# Patient Record
Sex: FEMALE | Race: WHITE | NOT HISPANIC OR LATINO | Employment: FULL TIME
[De-identification: names, ages, dates, MRNs, and addresses within clinical notes are randomized per-mention and may not be internally consistent; named-entity substitution may affect disease eponyms.]

---

## 2017-06-17 ENCOUNTER — HEALTH MAINTENANCE LETTER (OUTPATIENT)
Age: 52
End: 2017-06-17

## 2019-05-16 ENCOUNTER — OFFICE VISIT (OUTPATIENT)
Dept: FAMILY MEDICINE | Facility: CLINIC | Age: 54
End: 2019-05-16

## 2019-05-16 VITALS
HEART RATE: 65 BPM | TEMPERATURE: 98 F | WEIGHT: 151.2 LBS | DIASTOLIC BLOOD PRESSURE: 84 MMHG | HEIGHT: 70 IN | SYSTOLIC BLOOD PRESSURE: 116 MMHG | BODY MASS INDEX: 21.64 KG/M2 | OXYGEN SATURATION: 98 % | RESPIRATION RATE: 18 BRPM

## 2019-05-16 DIAGNOSIS — Z71.89 ACP (ADVANCE CARE PLANNING): ICD-10-CM

## 2019-05-16 DIAGNOSIS — S76.112A STRAIN OF QUADRICEPS MUSCLE, LEFT, INITIAL ENCOUNTER: Primary | ICD-10-CM

## 2019-05-16 DIAGNOSIS — Z76.89 HEALTH CARE HOME: ICD-10-CM

## 2019-05-16 PROCEDURE — 99203 OFFICE O/P NEW LOW 30 MIN: CPT | Performed by: FAMILY MEDICINE

## 2019-05-16 SDOH — HEALTH STABILITY: MENTAL HEALTH: HOW MANY STANDARD DRINKS CONTAINING ALCOHOL DO YOU HAVE ON A TYPICAL DAY?: 1 OR 2

## 2019-05-16 SDOH — ECONOMIC STABILITY: INCOME INSECURITY: HOW HARD IS IT FOR YOU TO PAY FOR THE VERY BASICS LIKE FOOD, HOUSING, MEDICAL CARE, AND HEATING?: NOT HARD AT ALL

## 2019-05-16 SDOH — ECONOMIC STABILITY: FOOD INSECURITY: WITHIN THE PAST 12 MONTHS, YOU WORRIED THAT YOUR FOOD WOULD RUN OUT BEFORE YOU GOT MONEY TO BUY MORE.: NEVER TRUE

## 2019-05-16 SDOH — ECONOMIC STABILITY: TRANSPORTATION INSECURITY
IN THE PAST 12 MONTHS, HAS THE LACK OF TRANSPORTATION KEPT YOU FROM MEDICAL APPOINTMENTS OR FROM GETTING MEDICATIONS?: NO

## 2019-05-16 SDOH — HEALTH STABILITY: MENTAL HEALTH: HOW OFTEN DO YOU HAVE A DRINK CONTAINING ALCOHOL?: 2-3 TIMES A WEEK

## 2019-05-16 SDOH — ECONOMIC STABILITY: TRANSPORTATION INSECURITY
IN THE PAST 12 MONTHS, HAS LACK OF TRANSPORTATION KEPT YOU FROM MEETINGS, WORK, OR FROM GETTING THINGS NEEDED FOR DAILY LIVING?: NO

## 2019-05-16 SDOH — ECONOMIC STABILITY: FOOD INSECURITY: WITHIN THE PAST 12 MONTHS, THE FOOD YOU BOUGHT JUST DIDN'T LAST AND YOU DIDN'T HAVE MONEY TO GET MORE.: NEVER TRUE

## 2019-05-16 SDOH — HEALTH STABILITY: MENTAL HEALTH: HOW OFTEN DO YOU HAVE 6 OR MORE DRINKS ON ONE OCCASION?: NEVER

## 2019-05-16 ASSESSMENT — MIFFLIN-ST. JEOR: SCORE: 1361.09

## 2019-05-16 NOTE — PATIENT INSTRUCTIONS
Strain of quadriceps muscle, left, initial encounter  (primary encounter diagnosis)  Comment: concern about a tear that has not healed reviewed  Plan: ice. Ibuprofen. Rehab stretches/ exercises to begin immediately and given in writing with illustrations.  Schedule orthopedic consultation

## 2019-05-16 NOTE — NURSING NOTE
Celia is here today to establish care and discuss left hip pain.    Pre-visit Screening:  Immunizations:  up to date  Colonoscopy:  unknown  Mammogram: unknown  Asthma Action Test/Plan:  NA  PHQ9:  PHQ 2 done today  GAD7:  NA  Questioned patient about current smoking habits Pt. has never smoked.  Ok to leave detailed message on voice mail for today's visit only Yes, phone # [phone]
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
## 2019-10-01 ENCOUNTER — HEALTH MAINTENANCE LETTER (OUTPATIENT)
Age: 54
End: 2019-10-01

## 2020-09-02 ENCOUNTER — OFFICE VISIT (OUTPATIENT)
Dept: FAMILY MEDICINE | Facility: CLINIC | Age: 55
End: 2020-09-02

## 2020-09-02 DIAGNOSIS — B00.1 RECURRENT COLD SORES: Primary | ICD-10-CM

## 2020-09-02 PROCEDURE — 99212 OFFICE O/P EST SF 10 MIN: CPT | Mod: GT | Performed by: PHYSICIAN ASSISTANT

## 2020-09-02 RX ORDER — VALACYCLOVIR HYDROCHLORIDE 1 G/1
2000 TABLET, FILM COATED ORAL 2 TIMES DAILY
Qty: 12 TABLET | Refills: 1 | Status: SHIPPED | OUTPATIENT
Start: 2020-09-02 | End: 2021-08-18

## 2021-01-15 ENCOUNTER — HEALTH MAINTENANCE LETTER (OUTPATIENT)
Age: 56
End: 2021-01-15

## 2021-01-21 ENCOUNTER — OFFICE VISIT (OUTPATIENT)
Dept: FAMILY MEDICINE | Facility: CLINIC | Age: 56
End: 2021-01-21

## 2021-01-21 VITALS
RESPIRATION RATE: 20 BRPM | TEMPERATURE: 97.6 F | SYSTOLIC BLOOD PRESSURE: 118 MMHG | BODY MASS INDEX: 22.05 KG/M2 | HEIGHT: 70 IN | WEIGHT: 154 LBS | HEART RATE: 85 BPM | OXYGEN SATURATION: 98 % | DIASTOLIC BLOOD PRESSURE: 76 MMHG

## 2021-01-21 DIAGNOSIS — Z13.1 SCREENING FOR DIABETES MELLITUS: ICD-10-CM

## 2021-01-21 DIAGNOSIS — Z01.411 ENCOUNTER FOR GYNECOLOGICAL EXAMINATION WITH ABNORMAL FINDING: Primary | ICD-10-CM

## 2021-01-21 DIAGNOSIS — Z12.31 ENCOUNTER FOR SCREENING MAMMOGRAM FOR BREAST CANCER: ICD-10-CM

## 2021-01-21 DIAGNOSIS — B00.1 RECURRENT COLD SORES: ICD-10-CM

## 2021-01-21 DIAGNOSIS — Z11.59 NEED FOR HEPATITIS C SCREENING TEST: ICD-10-CM

## 2021-01-21 DIAGNOSIS — Z12.11 SPECIAL SCREENING FOR MALIGNANT NEOPLASMS, COLON: ICD-10-CM

## 2021-01-21 DIAGNOSIS — Z13.220 SCREENING CHOLESTEROL LEVEL: ICD-10-CM

## 2021-01-21 LAB
CHOLEST SERPL-MCNC: 226 MG/DL (ref 0–199)
CHOLEST/HDLC SERPL: 3 {RATIO} (ref 0–5)
GLUCOSE SERPL-MCNC: 86 MG/DL (ref 60–99)
HDLC SERPL-MCNC: 84 MG/DL (ref 40–150)
HEMOGLOBIN: 13.5 G/DL (ref 11.7–15.7)
LDLC SERPL CALC-MCNC: 127 MG/DL (ref 0–130)
TRIGL SERPL-MCNC: 73 MG/DL (ref 0–149)

## 2021-01-21 PROCEDURE — 80061 LIPID PANEL: CPT | Performed by: FAMILY MEDICINE

## 2021-01-21 PROCEDURE — 82947 ASSAY GLUCOSE BLOOD QUANT: CPT | Performed by: FAMILY MEDICINE

## 2021-01-21 PROCEDURE — 99396 PREV VISIT EST AGE 40-64: CPT | Performed by: FAMILY MEDICINE

## 2021-01-21 PROCEDURE — 85018 HEMOGLOBIN: CPT | Performed by: FAMILY MEDICINE

## 2021-01-21 PROCEDURE — 36415 COLL VENOUS BLD VENIPUNCTURE: CPT | Performed by: FAMILY MEDICINE

## 2021-01-21 PROCEDURE — 86803 HEPATITIS C AB TEST: CPT | Mod: 90 | Performed by: FAMILY MEDICINE

## 2021-01-21 SDOH — HEALTH STABILITY: MENTAL HEALTH: HOW OFTEN DO YOU HAVE A DRINK CONTAINING ALCOHOL?: 2-4 TIMES A MONTH

## 2021-01-21 ASSESSMENT — MIFFLIN-ST. JEOR: SCORE: 1363.79

## 2021-01-21 NOTE — NURSING NOTE
Patient is here for a full physical exam.    Pre-Visit Screening :  Immunizations : up to date  Colon Screening : pt declines  Mammogram: pt declines  Asthma Action Test/Plan : NA  PHQ9 :  PHQ2 done today  GAD7 :  NA  Patient's  BMI Body mass index is 22.1 kg/m .  Questioned patient about current smoking habits.  Pt. has never smoked.  OK to leave a detailed voice message regarding today's visit Yes, phone # [phone]      ETOH screening: addressed in history

## 2021-01-21 NOTE — PROGRESS NOTES
SUBJECTIVE:  Celia Ugalde is an 55 year old  postmenopausal woman   who presents for annual gyn exam. Menopause at age 48. No   bleeding, spotting, or discharge noted. She is refusing pelvic exam    Estrogen replacement therapy: never  SILVESTRE exposure: no  History of abnormal Pap smear: No  Family history of uterine or ovarian cancer: No  Regular self breast exam: Yes  History of abnormal mammogram: No/last one 10 years ago  Family history of breast cancer: No  History of abnormal lipids: No/ last check 10 years ago for life insurance    No past medical history on file.    Review of patient's family history indicates:  Problem: Alcohol/Drug      Relation: Father          Age of Onset: (Not Specified)  Problem: C.A.D.      Relation: Father          Age of Onset: (Not Specified)          Comment: age 65 MI  Problem: Peripheral Vascular Disease      Relation: Father          Age of Onset: 76          Comment: amputation  Problem: Hypertension      Relation: Mother          Age of Onset: (Not Specified)  Problem: Arrhythmia      Relation: Mother          Age of Onset: 89          Comment: a fib  Problem: Autism Spectrum Disorder      Relation: Son          Age of Onset: (Not Specified)  Problem: Depression      Relation: Son          Age of Onset: (Not Specified)  Problem: Diabetes      Relation: No family hx of          Age of Onset: (Not Specified)  Problem: Breast Cancer      Relation: No family hx of          Age of Onset: (Not Specified)  Problem: Cancer - colorectal      Relation: No family hx of          Age of Onset: (Not Specified)      Past Surgical History:  2009: AS EXC MALIG SKIN LESION FACE/EARS <=0.5 CM      Comment:  forehead/basal cell  age 14: HC TOOTH EXTRACTION W/FORCEP    Current Outpatient Medications:     valACYclovir (VALTREX) 1000 mg tablet    No current facility-administered medications for this visit.     No Known Allergies    Social History    Tobacco Use      Smoking status: Never  "Smoker      Smokeless tobacco: Never Used    Alcohol use: Yes      Frequency: 2-4 times a month      Drinks per session: 1 or 2      Binge frequency: Never      Comment: 1 glass wine per month      Review Of Systems  Ears/Nose/Throat: cold sores on occasion  Respiratory: No shortness of breath, dyspnea on exertion, cough, or hemoptysis  Cardiovascular: negative  Gastrointestinal: negative  Genitourinary: negative    OBJECTIVE:  /76 (BP Location: Left arm, Patient Position: Sitting, Cuff Size: Adult Regular)   Pulse 85   Temp 97.6  F (36.4  C) (Oral)   Ht 1.778 m (5' 10\")   Wt 69.9 kg (154 lb)   LMP 06/10/2008   SpO2 98%   BMI 22.10 kg/m    General appearance: healthy, alert, no distress and cooperative  Skin: Skin color, texture, turgor normal. No rashes or lesions.  Ears: negative  Nose/Sinuses: Nares normal. Septum midline. Mucosa normal. No drainage or sinus tenderness.  Oropharynx: Lips, mucosa, and tongue normal. Teeth and gums normal.  Neck: Neck supple. No adenopathy. Thyroid symmetric, normal size,, Carotids without bruits.  Lungs: negative, Percussion normal. Good diaphragmatic excursion. Lungs clear  Heart: negative, PMI normal. No lifts, heaves, or thrills. RRR. No murmurs, clicks gallops or rub  Breasts: Inspection negative. No nipple discharge or bleeding. No masses.  Abdomen: Abdomen soft, non-tender. BS normal. No masses, organomegaly  Pelvic: Deferred  BMI : Body mass index is 22.1 kg/m .    ASSESSMENT:(Z01.411) Encounter for gynecological examination with abnormal finding  (primary encounter diagnosis)  Plan: HEMOGLOBIN (BFP), VENOUS COLLECTION        Colonoscopy recommended  Exercise encouraged  Fasting lipid profile obtained  Flu shot recommended  Follow up in 1 year  Glucose obtained  Hemoglobin obtained  Mammogram recommended  PAP smear recommended  Routine breast self-exam encouraged  Seat belt use encouraged  Sunscreen recommended      (B00.1) Recurrent cold sores  Plan: I have " reviewed the patient's medical history in detail and updated the computerized patient record.      (Z13.1) Screening for diabetes mellitus  Plan: Glucose Fasting (BFP), VENOUS COLLECTION            (Z13.220) Screening cholesterol level  Plan: Lipid Panel (BFP), VENOUS COLLECTION            (Z11.59) Need for hepatitis C screening test  Plan: VENOUS COLLECTION, Hepatits C antibody ([facility])            (Z12.11) Special screening for malignant neoplasms, colon  Plan: GASTROENTEROLOGY ADULT REF PROCEDURE ONLY            (Z12.31) Encounter for screening mammogram for breast cancer  Plan: Mammo Screening digital (bilat)                Dx:  1)  Pap smear, mammogram  2)  Lipids at appropriate intervals    PE:  Reviewed health maintenance including diet, regular exercise,   estrogen replacement and periodic exams.

## 2021-01-21 NOTE — PATIENT INSTRUCTIONS
Await labs    Colonoscopy recommended  Exercise encouraged  Fasting lipid profile obtained  Flu shot recommended  Follow up in 1 year  Glucose obtained  Hemoglobin obtained  Mammogram recommended  PAP smear orecommended  Routine breast self-exam encouraged  Seat belt use encouraged  Sunscreen recommended

## 2021-01-22 LAB
HCV AB - QUEST: NORMAL
SIGNAL TO CUT OFF - QUEST: 0.01

## 2021-08-18 DIAGNOSIS — Z11.59 ENCOUNTER FOR SCREENING FOR OTHER VIRAL DISEASES: ICD-10-CM

## 2021-08-18 DIAGNOSIS — B00.1 RECURRENT COLD SORES: ICD-10-CM

## 2021-08-18 RX ORDER — VALACYCLOVIR HYDROCHLORIDE 1 G/1
2000 TABLET, FILM COATED ORAL 2 TIMES DAILY
Qty: 12 TABLET | Refills: 1 | Status: SHIPPED | OUTPATIENT
Start: 2021-08-18 | End: 2021-08-26

## 2021-08-18 NOTE — TELEPHONE ENCOUNTER
Celia Ugalde is requesting a refill of:    Pending Prescriptions:                       Disp   Refills    valACYclovir (VALTREX) 1000 mg tablet     12 tab*1            Sig: Take 2 tablets (2,000 mg) by mouth 2 times daily    Can you review for LES?

## 2021-08-26 RX ORDER — COVID-19 ANTIGEN TEST
220 KIT MISCELLANEOUS 2 TIMES DAILY PRN
Status: ON HOLD | COMMUNITY
End: 2021-09-13

## 2021-08-26 RX ORDER — VALACYCLOVIR HYDROCHLORIDE 1 G/1
1000 TABLET, FILM COATED ORAL 2 TIMES DAILY PRN
COMMUNITY
End: 2022-01-04

## 2021-09-04 ENCOUNTER — HEALTH MAINTENANCE LETTER (OUTPATIENT)
Age: 56
End: 2021-09-04

## 2021-09-07 ENCOUNTER — OFFICE VISIT (OUTPATIENT)
Dept: FAMILY MEDICINE | Facility: CLINIC | Age: 56
End: 2021-09-07

## 2021-09-07 VITALS
HEIGHT: 70 IN | WEIGHT: 150.8 LBS | OXYGEN SATURATION: 97 % | HEART RATE: 80 BPM | BODY MASS INDEX: 21.59 KG/M2 | DIASTOLIC BLOOD PRESSURE: 78 MMHG | SYSTOLIC BLOOD PRESSURE: 116 MMHG | TEMPERATURE: 97.6 F

## 2021-09-07 DIAGNOSIS — M16.52 POST-TRAUMATIC OSTEOARTHRITIS OF LEFT HIP: ICD-10-CM

## 2021-09-07 DIAGNOSIS — Z01.818 PREOPERATIVE EXAMINATION: Primary | ICD-10-CM

## 2021-09-07 LAB — HEMOGLOBIN: 12.3 G/DL (ref 11.7–15.7)

## 2021-09-07 PROCEDURE — 99214 OFFICE O/P EST MOD 30 MIN: CPT | Performed by: FAMILY MEDICINE

## 2021-09-07 PROCEDURE — 36415 COLL VENOUS BLD VENIPUNCTURE: CPT | Performed by: FAMILY MEDICINE

## 2021-09-07 PROCEDURE — 85018 HEMOGLOBIN: CPT | Performed by: FAMILY MEDICINE

## 2021-09-07 ASSESSMENT — MIFFLIN-ST. JEOR: SCORE: 1349.27

## 2021-09-07 NOTE — PROGRESS NOTES
[facility]  [address] [place] [address]  [address]  [facility] [address]  Phone: [phone]  Fax: [phone]  Primary Provider: Tess Justice  Pre-op Performing Provider: LISA HO      PREOPERATIVE EVALUATION:  Today's date: 2021    Celia Ugalde is a 55 year old female who presents for a preoperative evaluation. ( 61)    Surgical Information:  Surgery/Procedure: Left total hip arthroplasty  Surgery Location: [facility]  Surgeon: Dr. Jeffries  Surgery Date: 21  Time of Surgery: AM  Where patient plans to recover: One night stay at [facility], the home with family  Fax number for surgical facility: Note does not need to be faxed, will be available electronically in Epic.    Type of Anesthesia Anticipated: to be determined    Assessment & Plan     The proposed surgical procedure is considered INTERMEDIATE risk.    Preoperative examination      Post-traumatic osteoarthritis of left hip            Risks and Recommendations:  The patient has the following additional risks and recommendations for perioperative complications:   - No identified additional risk factors other than previously addressed    Medication Instructions:  Patient is on no chronic medications    RECOMMENDATION:  APPROVAL GIVEN to proceed with proposed procedure, without further diagnostic evaluation.    Review of the result(s) of each unique test - labs                  Subjective     HPI related to upcoming procedure: Left hip DJD    1. No - Have you ever had a heart attack or stroke?  2. No - Have you ever had surgery on your heart or blood vessels, such as a stent, coronary (heart) bypass, or surgery on an artery in the head, neck, heart, or legs?  3. No - Do you have chest pain when you are physically active?  4. No - Do you have a history of heart failure?  5. No - Do you currently have a cold, bronchitis, or symptoms of other respiratory (head and chest) infections?  6. No - Do you have a  cough, shortness of breath, or wheezing?  7. No - Do you or anyone in your family have a history of blood clots?  8. No - Do you or anyone in your family have a serious bleeding problem, such as long-lasting bleeding after surgeries or cuts?  9. No - Have you ever had anemia or been told to take iron pills?  10. No - Have you had any abnormal blood loss such as black, tarry or bloody stools, or abnormal vaginal bleeding?  11. No - Have you ever had a blood transfusion?  12. Yes - Are you willing to have a blood transfusion if it is medically needed before, during, or after your surgery?  13. No - Have you or anyone in your family ever had problems with anesthesia (sedation for surgery)?  14. No - Do you have sleep apnea, excessive snoring, or daytime drowsiness?   15. No - Do you have any artifical heart valves or other implanted medical devices, such as a pacemaker, defibrillator, or continuous glucose monitor?  16. No - Do you have any artifical joints?  17. No - Are you allergic to latex?  18. No - Is there any chance that you may be pregnant?      Health Care Directive:  Patient does not have a Health Care Directive or Living Will: Discussed advance care planning with patient; information given to patient to review.    0956}    Status of Chronic Conditions:  See problem list for active medical problems.  Problems all longstanding and stable, except as noted/documented.  See ROS for pertinent symptoms related to these conditions.      Review of Systems  CONSTITUTIONAL: NEGATIVE for fever, chills, change in weight  ENT/MOUTH: NEGATIVE for ear, mouth and throat problems  RESP: NEGATIVE for significant cough or SOB  CV: NEGATIVE for chest pain, palpitations or peripheral edema    Patient Active Problem List    Diagnosis Date Noted     Inguinal hernia 06/17/2008     Priority: Medium     Problem list name updated by automated process. Provider to review       Health Care Home 05/16/2019     Priority: Low     ACP  (advance care planning) 05/16/2019     Priority: Low      No past medical history on file.  Past Surgical History:   Procedure Laterality Date     AS EXC MALIG SKIN LESION FACE/EARS <=0.5 CM  2009    forehead/basal cell     HC TOOTH EXTRACTION W/FORCEP  age 14     Current Outpatient Medications   Medication Sig Dispense Refill     aspirin-acetaminophen-caffeine (EXCEDRIN MIGRAINE) 250-250-65 MG tablet Take 1 tablet by mouth every 6 hours as needed for headaches       Multiple Vitamins-Minerals (WOMENS MULTIVITAMIN PO) Take by mouth daily       naproxen sodium 220 MG capsule Take 220 mg by mouth 2 times daily as needed       valACYclovir (VALTREX) 1000 mg tablet Take 1,000 mg by mouth 2 times daily as needed         No Known Allergies     Social History     Tobacco Use     Smoking status: Never Smoker     Smokeless tobacco: Never Used   Substance Use Topics     Alcohol use: Yes     Comment: 0-1 glass wine per week     Family History   Problem Relation Age of Onset     Alcohol/Drug Father      C.A.D. Father         age 65 MI     Peripheral Vascular Disease Father 76        amputation     Hypertension Mother      Arrhythmia Mother 89        a fib     Autism Spectrum Disorder Son      Depression Son      Hyperlipidemia Brother      Diabetes No family hx of      Breast Cancer No family hx of      Cancer - colorectal No family hx of      History   Drug Use No         Objective     LMP 06/10/2008     Physical Exam  GENERAL APPEARANCE: healthy, alert and no distress  HENT: ear canals and TM's normal and nose and mouth without ulcers or lesions  RESP: lungs clear to auscultation - no rales, rhonchi or wheezes  CV: regular rate and rhythm, normal S1 S2, no S3 or S4 and no murmur, click or rub   ABDOMEN: soft, nontender, no HSM or masses and bowel sounds normal  NEURO: Normal strength and tone, sensory exam grossly normal, mentation intact and speech normal    Recent Labs   Lab Test 01/21/21  1104   HGB 13.5         Diagnostics:  Recent Results (from the past 24 hour(s))   HEMOGLOBIN (BFP)    Collection Time: 09/07/21 12:00 AM   Result Value Ref Range    Hemoglobin 12.3 11.7 - 15.7 g/dL      No EKG required, no history of coronary heart disease, significant arrhythmia, peripheral arterial disease or other structural heart disease.    Revised Cardiac Risk Index (RCRI):  The patient has the following serious cardiovascular risks for perioperative complications:   - No serious cardiac risks = 0 points     RCRI Interpretation: 0 points: Class I (very low risk - 0.4% complication rate)           Signed Electronically by: Hernando Sommer MD  Copy of this evaluation report is provided to requesting physician.

## 2021-09-07 NOTE — H&P (VIEW-ONLY)
[facility]  [address] [place] [address]  [address]  [facility] [address]  Phone: [phone]  Fax: [phone]  Primary Provider: Tess Justice  Pre-op Performing Provider: LISA HO      PREOPERATIVE EVALUATION:  Today's date: 2021    Celia Ugalde is a 55 year old female who presents for a preoperative evaluation. ( 61)    Surgical Information:  Surgery/Procedure: Left total hip arthroplasty  Surgery Location: [facility]  Surgeon: Dr. Jeffries  Surgery Date: 21  Time of Surgery: AM  Where patient plans to recover: One night stay at [facility], the home with family  Fax number for surgical facility: Note does not need to be faxed, will be available electronically in Epic.    Type of Anesthesia Anticipated: to be determined    Assessment & Plan     The proposed surgical procedure is considered INTERMEDIATE risk.    Preoperative examination      Post-traumatic osteoarthritis of left hip            Risks and Recommendations:  The patient has the following additional risks and recommendations for perioperative complications:   - No identified additional risk factors other than previously addressed    Medication Instructions:  Patient is on no chronic medications    RECOMMENDATION:  APPROVAL GIVEN to proceed with proposed procedure, without further diagnostic evaluation.    Review of the result(s) of each unique test - labs                  Subjective     HPI related to upcoming procedure: Left hip DJD    1. No - Have you ever had a heart attack or stroke?  2. No - Have you ever had surgery on your heart or blood vessels, such as a stent, coronary (heart) bypass, or surgery on an artery in the head, neck, heart, or legs?  3. No - Do you have chest pain when you are physically active?  4. No - Do you have a history of heart failure?  5. No - Do you currently have a cold, bronchitis, or symptoms of other respiratory (head and chest) infections?  6. No - Do you have a  cough, shortness of breath, or wheezing?  7. No - Do you or anyone in your family have a history of blood clots?  8. No - Do you or anyone in your family have a serious bleeding problem, such as long-lasting bleeding after surgeries or cuts?  9. No - Have you ever had anemia or been told to take iron pills?  10. No - Have you had any abnormal blood loss such as black, tarry or bloody stools, or abnormal vaginal bleeding?  11. No - Have you ever had a blood transfusion?  12. Yes - Are you willing to have a blood transfusion if it is medically needed before, during, or after your surgery?  13. No - Have you or anyone in your family ever had problems with anesthesia (sedation for surgery)?  14. No - Do you have sleep apnea, excessive snoring, or daytime drowsiness?   15. No - Do you have any artifical heart valves or other implanted medical devices, such as a pacemaker, defibrillator, or continuous glucose monitor?  16. No - Do you have any artifical joints?  17. No - Are you allergic to latex?  18. No - Is there any chance that you may be pregnant?      Health Care Directive:  Patient does not have a Health Care Directive or Living Will: Discussed advance care planning with patient; information given to patient to review.    0956}    Status of Chronic Conditions:  See problem list for active medical problems.  Problems all longstanding and stable, except as noted/documented.  See ROS for pertinent symptoms related to these conditions.      Review of Systems  CONSTITUTIONAL: NEGATIVE for fever, chills, change in weight  ENT/MOUTH: NEGATIVE for ear, mouth and throat problems  RESP: NEGATIVE for significant cough or SOB  CV: NEGATIVE for chest pain, palpitations or peripheral edema    Patient Active Problem List    Diagnosis Date Noted     Inguinal hernia 06/17/2008     Priority: Medium     Problem list name updated by automated process. Provider to review       Health Care Home 05/16/2019     Priority: Low     ACP  (advance care planning) 05/16/2019     Priority: Low      No past medical history on file.  Past Surgical History:   Procedure Laterality Date     AS EXC MALIG SKIN LESION FACE/EARS <=0.5 CM  2009    forehead/basal cell     HC TOOTH EXTRACTION W/FORCEP  age 14     Current Outpatient Medications   Medication Sig Dispense Refill     aspirin-acetaminophen-caffeine (EXCEDRIN MIGRAINE) 250-250-65 MG tablet Take 1 tablet by mouth every 6 hours as needed for headaches       Multiple Vitamins-Minerals (WOMENS MULTIVITAMIN PO) Take by mouth daily       naproxen sodium 220 MG capsule Take 220 mg by mouth 2 times daily as needed       valACYclovir (VALTREX) 1000 mg tablet Take 1,000 mg by mouth 2 times daily as needed         No Known Allergies     Social History     Tobacco Use     Smoking status: Never Smoker     Smokeless tobacco: Never Used   Substance Use Topics     Alcohol use: Yes     Comment: 0-1 glass wine per week     Family History   Problem Relation Age of Onset     Alcohol/Drug Father      C.A.D. Father         age 65 MI     Peripheral Vascular Disease Father 76        amputation     Hypertension Mother      Arrhythmia Mother 89        a fib     Autism Spectrum Disorder Son      Depression Son      Hyperlipidemia Brother      Diabetes No family hx of      Breast Cancer No family hx of      Cancer - colorectal No family hx of      History   Drug Use No         Objective     LMP 06/10/2008     Physical Exam  GENERAL APPEARANCE: healthy, alert and no distress  HENT: ear canals and TM's normal and nose and mouth without ulcers or lesions  RESP: lungs clear to auscultation - no rales, rhonchi or wheezes  CV: regular rate and rhythm, normal S1 S2, no S3 or S4 and no murmur, click or rub   ABDOMEN: soft, nontender, no HSM or masses and bowel sounds normal  NEURO: Normal strength and tone, sensory exam grossly normal, mentation intact and speech normal    Recent Labs   Lab Test 01/21/21  1104   HGB 13.5         Diagnostics:  Recent Results (from the past 24 hour(s))   HEMOGLOBIN (BFP)    Collection Time: 09/07/21 12:00 AM   Result Value Ref Range    Hemoglobin 12.3 11.7 - 15.7 g/dL      No EKG required, no history of coronary heart disease, significant arrhythmia, peripheral arterial disease or other structural heart disease.    Revised Cardiac Risk Index (RCRI):  The patient has the following serious cardiovascular risks for perioperative complications:   - No serious cardiac risks = 0 points     RCRI Interpretation: 0 points: Class I (very low risk - 0.4% complication rate)           Signed Electronically by: Hernando Sommer MD  Copy of this evaluation report is provided to requesting physician.

## 2021-09-10 ENCOUNTER — LAB (OUTPATIENT)
Dept: LAB | Facility: CLINIC | Age: 56
End: 2021-09-10
Attending: ORTHOPAEDIC SURGERY
Payer: COMMERCIAL

## 2021-09-10 DIAGNOSIS — Z11.59 ENCOUNTER FOR SCREENING FOR OTHER VIRAL DISEASES: ICD-10-CM

## 2021-09-10 PROCEDURE — U0005 INFEC AGEN DETEC AMPLI PROBE: HCPCS

## 2021-09-10 PROCEDURE — U0003 INFECTIOUS AGENT DETECTION BY NUCLEIC ACID (DNA OR RNA); SEVERE ACUTE RESPIRATORY SYNDROME CORONAVIRUS 2 (SARS-COV-2) (CORONAVIRUS DISEASE [COVID-19]), AMPLIFIED PROBE TECHNIQUE, MAKING USE OF HIGH THROUGHPUT TECHNOLOGIES AS DESCRIBED BY CMS-2020-01-R: HCPCS

## 2021-09-11 LAB — SARS-COV-2 RNA RESP QL NAA+PROBE: NEGATIVE

## 2021-09-12 NOTE — PHARMACY-ADMISSION MEDICATION HISTORY
Admission medication history interview status for this patient is complete. See [facility] admission navigator for allergy information, prior to admission medications and immunization status.     PTA meds completed by pre-admitting nurse Ladonna Garzon and reviewed by pharmacy       Prior to Admission medications    Medication Sig Last Dose Taking? Auth Provider   aspirin-acetaminophen-caffeine (EXCEDRIN MIGRAINE) 250-250-65 MG tablet Take 1 tablet by mouth every 6 hours as needed for headaches  Yes Reported, Patient   Multiple Vitamins-Minerals (WOMENS MULTIVITAMIN PO) Take 1 tablet by mouth daily   Yes Reported, Patient   naproxen sodium 220 MG capsule Take 220 mg by mouth 2 times daily as needed  Yes Reported, Patient   valACYclovir (VALTREX) 1000 mg tablet Take 1,000 mg by mouth 2 times daily as needed  Yes Reported, Patient

## 2021-09-13 ENCOUNTER — ANESTHESIA (OUTPATIENT)
Dept: SURGERY | Facility: CLINIC | Age: 56
End: 2021-09-13
Payer: COMMERCIAL

## 2021-09-13 ENCOUNTER — ANESTHESIA EVENT (OUTPATIENT)
Dept: SURGERY | Facility: CLINIC | Age: 56
End: 2021-09-13
Payer: COMMERCIAL

## 2021-09-13 ENCOUNTER — HOSPITAL ENCOUNTER (OUTPATIENT)
Facility: CLINIC | Age: 56
Discharge: HOME | End: 2021-09-14
Attending: ORTHOPAEDIC SURGERY | Admitting: ORTHOPAEDIC SURGERY
Payer: COMMERCIAL

## 2021-09-13 ENCOUNTER — APPOINTMENT (OUTPATIENT)
Dept: PHYSICAL THERAPY | Facility: CLINIC | Age: 56
End: 2021-09-13
Attending: ORTHOPAEDIC SURGERY
Payer: COMMERCIAL

## 2021-09-13 ENCOUNTER — APPOINTMENT (OUTPATIENT)
Dept: GENERAL RADIOLOGY | Facility: CLINIC | Age: 56
End: 2021-09-13
Attending: ORTHOPAEDIC SURGERY
Payer: COMMERCIAL

## 2021-09-13 DIAGNOSIS — Z96.649 HISTORY OF TOTAL HIP REPLACEMENT, UNSPECIFIED LATERALITY: Primary | ICD-10-CM

## 2021-09-13 PROCEDURE — 999N000065 XR PELVIS AND HIP PORTABLE LEFT 1 VIEW

## 2021-09-13 PROCEDURE — 250N000011 HC RX IP 250 OP 636: Performed by: NURSE ANESTHETIST, CERTIFIED REGISTERED

## 2021-09-13 PROCEDURE — 250N000013 HC RX MED GY IP 250 OP 250 PS 637: Performed by: ORTHOPAEDIC SURGERY

## 2021-09-13 PROCEDURE — 272N000001 HC OR GENERAL SUPPLY STERILE: Performed by: ORTHOPAEDIC SURGERY

## 2021-09-13 PROCEDURE — 97161 PT EVAL LOW COMPLEX 20 MIN: CPT | Mod: GP | Performed by: PHYSICAL THERAPIST

## 2021-09-13 PROCEDURE — 250N000011 HC RX IP 250 OP 636: Performed by: PHYSICIAN ASSISTANT

## 2021-09-13 PROCEDURE — 258N000003 HC RX IP 258 OP 636: Performed by: PHYSICIAN ASSISTANT

## 2021-09-13 PROCEDURE — 258N000003 HC RX IP 258 OP 636: Performed by: NURSE ANESTHETIST, CERTIFIED REGISTERED

## 2021-09-13 PROCEDURE — C1776 JOINT DEVICE (IMPLANTABLE): HCPCS | Performed by: ORTHOPAEDIC SURGERY

## 2021-09-13 PROCEDURE — 258N000003 HC RX IP 258 OP 636: Performed by: ANESTHESIOLOGY

## 2021-09-13 PROCEDURE — 710N000009 HC RECOVERY PHASE 1, LEVEL 1, PER MIN: Performed by: ORTHOPAEDIC SURGERY

## 2021-09-13 PROCEDURE — 250N000011 HC RX IP 250 OP 636: Performed by: ORTHOPAEDIC SURGERY

## 2021-09-13 PROCEDURE — 999N000141 HC STATISTIC PRE-PROCEDURE NURSING ASSESSMENT: Performed by: ORTHOPAEDIC SURGERY

## 2021-09-13 PROCEDURE — 97110 THERAPEUTIC EXERCISES: CPT | Mod: GP | Performed by: PHYSICAL THERAPIST

## 2021-09-13 PROCEDURE — 250N000009 HC RX 250: Performed by: NURSE ANESTHETIST, CERTIFIED REGISTERED

## 2021-09-13 PROCEDURE — 97116 GAIT TRAINING THERAPY: CPT | Mod: GP | Performed by: PHYSICAL THERAPIST

## 2021-09-13 PROCEDURE — 370N000017 HC ANESTHESIA TECHNICAL FEE, PER MIN: Performed by: ORTHOPAEDIC SURGERY

## 2021-09-13 PROCEDURE — C1713 ANCHOR/SCREW BN/BN,TIS/BN: HCPCS | Performed by: ORTHOPAEDIC SURGERY

## 2021-09-13 PROCEDURE — 360N000077 HC SURGERY LEVEL 4, PER MIN: Performed by: ORTHOPAEDIC SURGERY

## 2021-09-13 PROCEDURE — 258N000001 HC RX 258: Performed by: ORTHOPAEDIC SURGERY

## 2021-09-13 PROCEDURE — 250N000013 HC RX MED GY IP 250 OP 250 PS 637: Performed by: PHYSICIAN ASSISTANT

## 2021-09-13 PROCEDURE — 250N000009 HC RX 250: Performed by: ORTHOPAEDIC SURGERY

## 2021-09-13 DEVICE — IMPLANTABLE DEVICE: Type: IMPLANTABLE DEVICE | Site: HIP | Status: FUNCTIONAL

## 2021-09-13 DEVICE — IMP SHELL BIOM G7 ACETAB PPS LIM HOLE 56MM SZ F 010000665: Type: IMPLANTABLE DEVICE | Site: HIP | Status: FUNCTIONAL

## 2021-09-13 DEVICE — IMP SCR ZIM 6.5X30MM ACET CUP SELF TAP 00-6250-065-30: Type: IMPLANTABLE DEVICE | Site: HIP | Status: FUNCTIONAL

## 2021-09-13 DEVICE — IMP SCR ZIM 6.5X35MM ACET CUP SELF TAP 00-6250-065-35: Type: IMPLANTABLE DEVICE | Site: HIP | Status: FUNCTIONAL

## 2021-09-13 DEVICE — IMP HEAD FEM ZIM BIOLOX DELTA CER 36MM  -3.5 00-8775-036-01: Type: IMPLANTABLE DEVICE | Site: HIP | Status: FUNCTIONAL

## 2021-09-13 RX ORDER — DEXAMETHASONE SODIUM PHOSPHATE 4 MG/ML
INJECTION, SOLUTION INTRA-ARTICULAR; INTRALESIONAL; INTRAMUSCULAR; INTRAVENOUS; SOFT TISSUE PRN
Status: DISCONTINUED | OUTPATIENT
Start: 2021-09-13 | End: 2021-09-13

## 2021-09-13 RX ORDER — BUPIVACAINE HYDROCHLORIDE 5 MG/ML
INJECTION, SOLUTION EPIDURAL; INTRACAUDAL PRN
Status: DISCONTINUED | OUTPATIENT
Start: 2021-09-13 | End: 2021-09-13

## 2021-09-13 RX ORDER — HYDROMORPHONE HCL IN WATER/PF 6 MG/30 ML
0.4 PATIENT CONTROLLED ANALGESIA SYRINGE INTRAVENOUS
Status: DISCONTINUED | OUTPATIENT
Start: 2021-09-13 | End: 2021-09-14 | Stop reason: HOSPADM

## 2021-09-13 RX ORDER — ONDANSETRON 2 MG/ML
4 INJECTION INTRAMUSCULAR; INTRAVENOUS EVERY 6 HOURS PRN
Status: DISCONTINUED | OUTPATIENT
Start: 2021-09-13 | End: 2021-09-14 | Stop reason: HOSPADM

## 2021-09-13 RX ORDER — ONDANSETRON 2 MG/ML
4 INJECTION INTRAMUSCULAR; INTRAVENOUS EVERY 30 MIN PRN
Status: DISCONTINUED | OUTPATIENT
Start: 2021-09-13 | End: 2021-09-13 | Stop reason: HOSPADM

## 2021-09-13 RX ORDER — HYDRALAZINE HYDROCHLORIDE 20 MG/ML
2.5-5 INJECTION INTRAMUSCULAR; INTRAVENOUS EVERY 10 MIN PRN
Status: DISCONTINUED | OUTPATIENT
Start: 2021-09-13 | End: 2021-09-13 | Stop reason: HOSPADM

## 2021-09-13 RX ORDER — FENTANYL CITRATE 50 UG/ML
50 INJECTION, SOLUTION INTRAMUSCULAR; INTRAVENOUS EVERY 5 MIN PRN
Status: DISCONTINUED | OUTPATIENT
Start: 2021-09-13 | End: 2021-09-13 | Stop reason: HOSPADM

## 2021-09-13 RX ORDER — TRANEXAMIC ACID 650 MG/1
1950 TABLET ORAL ONCE
Status: COMPLETED | OUTPATIENT
Start: 2021-09-13 | End: 2021-09-13

## 2021-09-13 RX ORDER — BISACODYL 10 MG
10 SUPPOSITORY, RECTAL RECTAL DAILY PRN
Status: DISCONTINUED | OUTPATIENT
Start: 2021-09-13 | End: 2021-09-14 | Stop reason: HOSPADM

## 2021-09-13 RX ORDER — OXYCODONE HYDROCHLORIDE 5 MG/1
5 TABLET ORAL EVERY 4 HOURS PRN
Status: DISCONTINUED | OUTPATIENT
Start: 2021-09-13 | End: 2021-09-14 | Stop reason: HOSPADM

## 2021-09-13 RX ORDER — SODIUM CHLORIDE, SODIUM LACTATE, POTASSIUM CHLORIDE, CALCIUM CHLORIDE 600; 310; 30; 20 MG/100ML; MG/100ML; MG/100ML; MG/100ML
INJECTION, SOLUTION INTRAVENOUS CONTINUOUS
Status: DISCONTINUED | OUTPATIENT
Start: 2021-09-13 | End: 2021-09-14 | Stop reason: HOSPADM

## 2021-09-13 RX ORDER — NALOXONE HYDROCHLORIDE 0.4 MG/ML
0.4 INJECTION, SOLUTION INTRAMUSCULAR; INTRAVENOUS; SUBCUTANEOUS
Status: DISCONTINUED | OUTPATIENT
Start: 2021-09-13 | End: 2021-09-14 | Stop reason: HOSPADM

## 2021-09-13 RX ORDER — OXYCODONE HYDROCHLORIDE 5 MG/1
5 TABLET ORAL EVERY 4 HOURS PRN
Status: DISCONTINUED | OUTPATIENT
Start: 2021-09-13 | End: 2021-09-13 | Stop reason: HOSPADM

## 2021-09-13 RX ORDER — LIDOCAINE 40 MG/G
CREAM TOPICAL
Status: DISCONTINUED | OUTPATIENT
Start: 2021-09-13 | End: 2021-09-13 | Stop reason: HOSPADM

## 2021-09-13 RX ORDER — NALOXONE HYDROCHLORIDE 0.4 MG/ML
0.2 INJECTION, SOLUTION INTRAMUSCULAR; INTRAVENOUS; SUBCUTANEOUS
Status: DISCONTINUED | OUTPATIENT
Start: 2021-09-13 | End: 2021-09-14 | Stop reason: HOSPADM

## 2021-09-13 RX ORDER — OXYCODONE HYDROCHLORIDE 5 MG/1
10 TABLET ORAL EVERY 4 HOURS PRN
Status: DISCONTINUED | OUTPATIENT
Start: 2021-09-13 | End: 2021-09-14 | Stop reason: HOSPADM

## 2021-09-13 RX ORDER — SODIUM CHLORIDE, SODIUM LACTATE, POTASSIUM CHLORIDE, CALCIUM CHLORIDE 600; 310; 30; 20 MG/100ML; MG/100ML; MG/100ML; MG/100ML
INJECTION, SOLUTION INTRAVENOUS CONTINUOUS
Status: DISCONTINUED | OUTPATIENT
Start: 2021-09-13 | End: 2021-09-13 | Stop reason: HOSPADM

## 2021-09-13 RX ORDER — HYDROXYZINE HYDROCHLORIDE 25 MG/1
25 TABLET, FILM COATED ORAL EVERY 6 HOURS PRN
Status: DISCONTINUED | OUTPATIENT
Start: 2021-09-13 | End: 2021-09-14 | Stop reason: HOSPADM

## 2021-09-13 RX ORDER — CEFAZOLIN SODIUM/WATER 2 G/20 ML
2 SYRINGE (ML) INTRAVENOUS
Status: COMPLETED | OUTPATIENT
Start: 2021-09-13 | End: 2021-09-13

## 2021-09-13 RX ORDER — ONDANSETRON 4 MG/1
4 TABLET, ORALLY DISINTEGRATING ORAL EVERY 6 HOURS PRN
Status: DISCONTINUED | OUTPATIENT
Start: 2021-09-13 | End: 2021-09-14 | Stop reason: HOSPADM

## 2021-09-13 RX ORDER — IBUPROFEN 400 MG/1
400 TABLET, FILM COATED ORAL EVERY 4 HOURS PRN
Status: DISCONTINUED | OUTPATIENT
Start: 2021-09-13 | End: 2021-09-14 | Stop reason: HOSPADM

## 2021-09-13 RX ORDER — HYDROMORPHONE HCL IN WATER/PF 6 MG/30 ML
0.2 PATIENT CONTROLLED ANALGESIA SYRINGE INTRAVENOUS
Status: DISCONTINUED | OUTPATIENT
Start: 2021-09-13 | End: 2021-09-14 | Stop reason: HOSPADM

## 2021-09-13 RX ORDER — PROCHLORPERAZINE MALEATE 5 MG
10 TABLET ORAL EVERY 6 HOURS PRN
Status: DISCONTINUED | OUTPATIENT
Start: 2021-09-13 | End: 2021-09-14 | Stop reason: HOSPADM

## 2021-09-13 RX ORDER — POLYETHYLENE GLYCOL 3350 17 G/17G
17 POWDER, FOR SOLUTION ORAL DAILY
Status: DISCONTINUED | OUTPATIENT
Start: 2021-09-14 | End: 2021-09-14 | Stop reason: HOSPADM

## 2021-09-13 RX ORDER — OXYCODONE HYDROCHLORIDE 5 MG/1
TABLET ORAL
Qty: 50 TABLET | Refills: 0 | Status: SHIPPED | OUTPATIENT
Start: 2021-09-13 | End: 2022-02-03

## 2021-09-13 RX ORDER — AMOXICILLIN 250 MG
1-2 CAPSULE ORAL 2 TIMES DAILY
Qty: 30 TABLET | Refills: 0 | Status: SHIPPED | OUTPATIENT
Start: 2021-09-13 | End: 2022-02-03

## 2021-09-13 RX ORDER — PROPOFOL 10 MG/ML
INJECTION, EMULSION INTRAVENOUS CONTINUOUS PRN
Status: DISCONTINUED | OUTPATIENT
Start: 2021-09-13 | End: 2021-09-13

## 2021-09-13 RX ORDER — LABETALOL HYDROCHLORIDE 5 MG/ML
10 INJECTION, SOLUTION INTRAVENOUS
Status: DISCONTINUED | OUTPATIENT
Start: 2021-09-13 | End: 2021-09-13 | Stop reason: HOSPADM

## 2021-09-13 RX ORDER — EPHEDRINE SULFATE 50 MG/ML
INJECTION, SOLUTION INTRAMUSCULAR; INTRAVENOUS; SUBCUTANEOUS PRN
Status: DISCONTINUED | OUTPATIENT
Start: 2021-09-13 | End: 2021-09-13

## 2021-09-13 RX ORDER — LIDOCAINE 40 MG/G
CREAM TOPICAL
Status: DISCONTINUED | OUTPATIENT
Start: 2021-09-13 | End: 2021-09-14 | Stop reason: HOSPADM

## 2021-09-13 RX ORDER — FENTANYL CITRATE 50 UG/ML
INJECTION, SOLUTION INTRAMUSCULAR; INTRAVENOUS PRN
Status: DISCONTINUED | OUTPATIENT
Start: 2021-09-13 | End: 2021-09-13

## 2021-09-13 RX ORDER — IBUPROFEN 600 MG/1
600 TABLET, FILM COATED ORAL EVERY 6 HOURS PRN
Qty: 30 TABLET | Refills: 0 | Status: SHIPPED | OUTPATIENT
Start: 2021-09-13 | End: 2022-02-03

## 2021-09-13 RX ORDER — CEFAZOLIN SODIUM 1 G/3ML
1 INJECTION, POWDER, FOR SOLUTION INTRAMUSCULAR; INTRAVENOUS EVERY 8 HOURS
Status: COMPLETED | OUTPATIENT
Start: 2021-09-13 | End: 2021-09-13

## 2021-09-13 RX ORDER — ACETAMINOPHEN 325 MG/1
650 TABLET ORAL EVERY 4 HOURS PRN
Qty: 100 TABLET | Refills: 0 | Status: SHIPPED | OUTPATIENT
Start: 2021-09-13 | End: 2022-02-03

## 2021-09-13 RX ORDER — ACETAMINOPHEN 325 MG/1
975 TABLET ORAL EVERY 8 HOURS
Status: DISCONTINUED | OUTPATIENT
Start: 2021-09-13 | End: 2021-09-14 | Stop reason: HOSPADM

## 2021-09-13 RX ORDER — ACETAMINOPHEN 325 MG/1
650 TABLET ORAL EVERY 4 HOURS PRN
Status: DISCONTINUED | OUTPATIENT
Start: 2021-09-16 | End: 2021-09-14 | Stop reason: HOSPADM

## 2021-09-13 RX ORDER — HYDROXYZINE HYDROCHLORIDE 25 MG/1
25 TABLET, FILM COATED ORAL EVERY 6 HOURS PRN
Qty: 30 TABLET | Refills: 0 | Status: SHIPPED | OUTPATIENT
Start: 2021-09-13 | End: 2021-09-27

## 2021-09-13 RX ORDER — AMOXICILLIN 250 MG
1 CAPSULE ORAL 2 TIMES DAILY
Status: DISCONTINUED | OUTPATIENT
Start: 2021-09-13 | End: 2021-09-14 | Stop reason: HOSPADM

## 2021-09-13 RX ORDER — ONDANSETRON 4 MG/1
4 TABLET, ORALLY DISINTEGRATING ORAL EVERY 30 MIN PRN
Status: DISCONTINUED | OUTPATIENT
Start: 2021-09-13 | End: 2021-09-13 | Stop reason: HOSPADM

## 2021-09-13 RX ORDER — HYDROMORPHONE HCL IN WATER/PF 6 MG/30 ML
0.4 PATIENT CONTROLLED ANALGESIA SYRINGE INTRAVENOUS EVERY 5 MIN PRN
Status: DISCONTINUED | OUTPATIENT
Start: 2021-09-13 | End: 2021-09-13 | Stop reason: HOSPADM

## 2021-09-13 RX ORDER — ACETAMINOPHEN 325 MG/1
975 TABLET ORAL ONCE
Status: COMPLETED | OUTPATIENT
Start: 2021-09-13 | End: 2021-09-13

## 2021-09-13 RX ORDER — SODIUM CHLORIDE, SODIUM LACTATE, POTASSIUM CHLORIDE, CALCIUM CHLORIDE 600; 310; 30; 20 MG/100ML; MG/100ML; MG/100ML; MG/100ML
INJECTION, SOLUTION INTRAVENOUS CONTINUOUS PRN
Status: DISCONTINUED | OUTPATIENT
Start: 2021-09-13 | End: 2021-09-13

## 2021-09-13 RX ORDER — CEFAZOLIN SODIUM 2 G/100ML
2 INJECTION, SOLUTION INTRAVENOUS SEE ADMIN INSTRUCTIONS
Status: DISCONTINUED | OUTPATIENT
Start: 2021-09-13 | End: 2021-09-13 | Stop reason: HOSPADM

## 2021-09-13 RX ORDER — ONDANSETRON 2 MG/ML
INJECTION INTRAMUSCULAR; INTRAVENOUS PRN
Status: DISCONTINUED | OUTPATIENT
Start: 2021-09-13 | End: 2021-09-13

## 2021-09-13 RX ADMIN — SODIUM CHLORIDE, SODIUM LACTATE, POTASSIUM CHLORIDE, CALCIUM CHLORIDE: 600; 310; 30; 20 INJECTION, SOLUTION INTRAVENOUS at 08:05

## 2021-09-13 RX ADMIN — SODIUM CHLORIDE, POTASSIUM CHLORIDE, SODIUM LACTATE AND CALCIUM CHLORIDE: 600; 310; 30; 20 INJECTION, SOLUTION INTRAVENOUS at 12:46

## 2021-09-13 RX ADMIN — SODIUM CHLORIDE, SODIUM LACTATE, POTASSIUM CHLORIDE, CALCIUM CHLORIDE: 600; 310; 30; 20 INJECTION, SOLUTION INTRAVENOUS at 07:10

## 2021-09-13 RX ADMIN — BUPIVACAINE HYDROCHLORIDE 10 MG: 5 INJECTION, SOLUTION EPIDURAL; INTRACAUDAL at 07:27

## 2021-09-13 RX ADMIN — Medication 2 G: at 07:15

## 2021-09-13 RX ADMIN — DOCUSATE SODIUM AND SENNOSIDES 1 TABLET: 8.6; 5 TABLET, FILM COATED ORAL at 19:53

## 2021-09-13 RX ADMIN — CEFAZOLIN 1 G: 1 INJECTION, POWDER, FOR SOLUTION INTRAMUSCULAR; INTRAVENOUS at 22:14

## 2021-09-13 RX ADMIN — ACETAMINOPHEN 975 MG: 325 TABLET, FILM COATED ORAL at 22:14

## 2021-09-13 RX ADMIN — Medication 10 MG: at 07:55

## 2021-09-13 RX ADMIN — ACETAMINOPHEN 975 MG: 325 TABLET, FILM COATED ORAL at 15:21

## 2021-09-13 RX ADMIN — OXYCODONE HYDROCHLORIDE 5 MG: 5 TABLET ORAL at 22:14

## 2021-09-13 RX ADMIN — ONDANSETRON HYDROCHLORIDE 4 MG: 2 INJECTION, SOLUTION INTRAVENOUS at 07:25

## 2021-09-13 RX ADMIN — DEXMEDETOMIDINE 32 MCG: 100 INJECTION, SOLUTION, CONCENTRATE INTRAVENOUS at 07:24

## 2021-09-13 RX ADMIN — TRANEXAMIC ACID 1950 MG: 650 TABLET ORAL at 06:21

## 2021-09-13 RX ADMIN — PROPOFOL 75 MCG/KG/MIN: 10 INJECTION, EMULSION INTRAVENOUS at 07:32

## 2021-09-13 RX ADMIN — Medication 10 MG: at 08:12

## 2021-09-13 RX ADMIN — ASPIRIN 325 MG: 325 TABLET, COATED ORAL at 15:21

## 2021-09-13 RX ADMIN — CEFAZOLIN 1 G: 1 INJECTION, POWDER, FOR SOLUTION INTRAMUSCULAR; INTRAVENOUS at 15:21

## 2021-09-13 RX ADMIN — FENTANYL CITRATE 50 MCG: 50 INJECTION, SOLUTION INTRAMUSCULAR; INTRAVENOUS at 07:25

## 2021-09-13 RX ADMIN — MIDAZOLAM 2 MG: 1 INJECTION INTRAMUSCULAR; INTRAVENOUS at 07:20

## 2021-09-13 RX ADMIN — DEXAMETHASONE SODIUM PHOSPHATE 4 MG: 4 INJECTION, SOLUTION INTRA-ARTICULAR; INTRALESIONAL; INTRAMUSCULAR; INTRAVENOUS; SOFT TISSUE at 07:35

## 2021-09-13 RX ADMIN — ACETAMINOPHEN 975 MG: 325 TABLET, FILM COATED ORAL at 06:21

## 2021-09-13 RX ADMIN — Medication 2 G: at 07:20

## 2021-09-13 ASSESSMENT — MIFFLIN-ST. JEOR: SCORE: 1345.65

## 2021-09-13 ASSESSMENT — ACTIVITIES OF DAILY LIVING (ADL)
DIFFICULTY_EATING/SWALLOWING: NO
FALL_HISTORY_WITHIN_LAST_SIX_MONTHS: NO
DOING_ERRANDS_INDEPENDENTLY_DIFFICULTY: NO
HEARING_DIFFICULTY_OR_DEAF: NO
WEAR_GLASSES_OR_BLIND: YES
WALKING_OR_CLIMBING_STAIRS_DIFFICULTY: NO
TOILETING_ISSUES: NO
VISION_MANAGEMENT: READING GLASSES
DRESSING/BATHING_DIFFICULTY: NO
PATIENT_/_FAMILY_COMMUNICATION_STYLE: SPOKEN LANGUAGE (ENGLISH OR BILINGUAL)
DIFFICULTY_COMMUNICATING: NO
CONCENTRATING,_REMEMBERING_OR_MAKING_DECISIONS_DIFFICULTY: NO

## 2021-09-13 NOTE — ANESTHESIA CARE TRANSFER NOTE
Patient: Celia Ugalde    Procedure(s):  Left total hip arthroplasty    Diagnosis: DJD (degenerative joint disease) [M19.90]  Diagnosis Additional Information: No value filed.    Anesthesia Type:   Spinal     Note:    Oropharynx: oropharynx clear of all foreign objects and spontaneously breathing  Level of Consciousness: awake  Oxygen Supplementation: room air    Independent Airway: airway patency satisfactory and stable  Dentition: dentition unchanged  Vital Signs Stable: post-procedure vital signs reviewed and stable  Report to RN Given: handoff report given  Patient transferred to: PACU    Handoff Report: Identifed the Patient, Identified the Reponsible Provider, Reviewed the pertinent medical history, Discussed the surgical course, Reviewed Intra-OP anesthesia mangement and issues during anesthesia, Set expectations for post-procedure period and Allowed opportunity for questions and acknowledgement of understanding      Vitals:  Vitals Value Taken Time   /68 09/13/21 0847   Temp     Pulse 71 09/13/21 0849   Resp 8 09/13/21 0849   SpO2 100 % 09/13/21 0849   Vitals shown include unvalidated device data.    Electronically Signed By: CIRO Chao CRNA  September 13, 2021  8:51 AM

## 2021-09-13 NOTE — PROGRESS NOTES
09/13/21 1500   Quick Adds   Type of Visit Initial PT Evaluation   Living Environment   People in home significant other   Current Living Arrangements house   Home Accessibility stairs to enter home;stairs within home   Number of Stairs, Main Entrance 2   Stair Railings, Main Entrance none   Number of Stairs, Within Home, Primary greater than 10 stairs   Stair Railings, Within Home, Primary railings on both sides of stairs;railing on right side (ascending)   Transportation Anticipated family or friend will provide   Living Environment Comments Pt lives in a 2 level home with her boyfriend.  Pt has 2 CHRISTOPHER and 1 flight with B rails [employer] and then R rail only to her bedroom.  Pt will be borrowing from friends the following DME:  FWWs, 2 Toilet seat risers, 2 reachers, 2 canes, shower chair   Self-Care   Usual Activity Tolerance good   Current Activity Tolerance moderate   Regular Exercise Yes   Activity/Exercise Type walking   Exercise Amount/Frequency daily   Equipment Currently Used at Home none   Activity/Exercise/Self-Care Comment Pt was very active prior to surgery, ambulating 5 mi per day.  Pt was I with all ADLs and IADLs   Disability/Function   Hearing Difficulty or Deaf no   Wear Glasses or Blind yes   Vision Management reading glasses   Concentrating, Remembering or Making Decisions Difficulty no   Difficulty Communicating no   Difficulty Eating/Swallowing no   Walking or Climbing Stairs Difficulty no   Dressing/Bathing Difficulty no   Toileting issues no   Doing Errands Independently Difficulty (such as shopping) yes   Fall history within last six months no   Change in Functional Status Since Onset of Current Illness/Injury yes   General Information   Onset of Illness/Injury or Date of Surgery 09/13/21   Referring Physician Dr. Antonio Jeffries   Patient/Family Therapy Goals Statement (PT) Pt would like to attend an outdoor concert on Friday.   Pertinent History of Current Problem (include personal factors  and/or comorbidities that impact the POC) 55 yr old pt admitted for L LUDY secondary to OA.     Existing Precautions/Restrictions no hip IR;no hip ADD past midline   Weight-Bearing Status - RLE weight-bearing as tolerated   General Observations  Pt lying in bed on room air   Cognition   Orientation Status (Cognition) oriented x 4   Affect/Mental Status (Cognition) WNL   Follows Commands (Cognition) WNL   Pain Assessment   Patient Currently in Pain No  (block still in effect, no L hip pain)   Integumentary/Edema   Integumentary/Edema Comments L hip incision   Posture    Posture Not impaired   Range of Motion (ROM)   ROM Comment L hip to 90 deg hip flexion in sitting   Strength   Strength Comments Decreased L LE strength.  Pt able to perform independent L SLR.     Bed Mobility   Bed Mobility supine-sit   Supine-Sit [place] (Bed Mobility) supervision;verbal cues   Transfers   Transfers sit-stand transfer   Sit-Stand Transfer   Sit-Stand [place] (Transfers) verbal cues;contact guard   Assistive Device (Sit-Stand Transfers) walker, front-wheeled   Sit/Stand Transfer Comments Sit <> stand to FWW with CGA/SBA and VCs for safe hand placement   Gait/Stairs (Locomotion)   [place] Level (Gait) verbal cues;contact guard   Assistive Device (Gait) walker, front-wheeled   Distance in Feet (Required for LE Total Joints) 400   Comment (Gait/Stairs) Pt amb 400' with FWW and CGA/SBA with continuous gait pattern and symmetrical step lengths.  VCs to keep FWW on ground.     Balance   Balance Comments Pt able to stand statically without UE support but needs UE support on FWW for safe dynamic standing balance   Sensory Examination   Sensory Perception Comments L LE numb from surgical block   Coordination   Coordination no deficits were identified   Muscle Tone   Muscle Tone no deficits were identified   Clinical Impression   Criteria for Skilled Therapeutic Intervention yes, treatment indicated   PT Diagnosis (PT) Impaired  gait   Influenced by the following impairments Impaired gait and balance, decreased strength L LE   Functional limitations due to impairments Unable to amb longer community distances or on uneven surfaces, need for AD with gait   Clinical Presentation Stable/Uncomplicated   Clinical Presentation Rationale Pt medically stable   Clinical Decision Making (Complexity) low complexity   Therapy Frequency (PT) 2x/day   Predicted Duration of Therapy Intervention (days/wks) 2 days   Planned Therapy Interventions (PT) balance training;bed mobility training;gait training;home exercise program;patient/family education;ROM (range of motion);stair training;strengthening;transfer training;progressive activity/exercise;risk factor education;home program guidelines   Anticipated Equipment Needs at Discharge (PT)   (pt borrowing all DME from friends)   Risk & Benefits of therapy have been explained evaluation/treatment results reviewed;care plan/treatment goals reviewed;risks/benefits reviewed;current/potential barriers reviewed;participants voiced agreement with care plan;participants included;patient   PT Discharge Planning    PT Discharge Recommendation (DC Rec)   (defer to ortho)   PT Rationale for DC Rec I anticipate that at time of DC pt will be independent with bed mobility and transfers and S with gait on level surfaces and SBA on stairs.     PT Brief overview of current status  SBA with FWW   Therapy Certification   Start of care date 09/13/21   Certification date from 09/13/21   Certification date to 09/14/21   Total Evaluation Time   Total Evaluation Time (Minutes) 10

## 2021-09-13 NOTE — ANESTHESIA POSTPROCEDURE EVALUATION
Patient: Celia Ugalde    Procedure(s):  Left total hip arthroplasty    Diagnosis:DJD (degenerative joint disease) [M19.90]  Diagnosis Additional Information: No value filed.    Anesthesia Type:  Spinal    Note:  Disposition: Admission   Postop Pain Control: Uneventful            Sign Out: Well controlled pain   PONV: No   Neuro/Psych: Uneventful            Sign Out: Acceptable/Baseline neuro status   Airway/Respiratory: Uneventful            Sign Out: Acceptable/Baseline resp. status   CV/Hemodynamics: Uneventful            Sign Out: Acceptable CV status; No obvious hypovolemia; No obvious fluid overload   Other NRE: NONE   DID A NON-ROUTINE EVENT OCCUR? No           Last vitals:  Vitals Value Taken Time   /68 09/13/21 0930   Temp 96.9  F (36.1  C) 09/13/21 0845   Pulse 67 09/13/21 0931   Resp 9 09/13/21 0931   SpO2 100 % 09/13/21 0931   Vitals shown include unvalidated device data.    Electronically Signed By: Ariadne Xie MD  September 13, 2021  9:32 AM

## 2021-09-13 NOTE — ANESTHESIA PROCEDURE NOTES
Intrathecal injection Procedure Note  Pre-Procedure   Staff -        Anesthesiologist:  Ariadne Xie MD       Performed By: anesthesiologist       Location: OR       Procedure Start/Stop Times: 9/13/2021 7:27 AM       Pre-Anesthestic Checklist: patient identified, IV checked, risks and benefits discussed, informed consent, monitors and equipment checked, pre-op evaluation, at physician/surgeon's request and post-op pain management  Timeout:       Correct Patient: Yes        Correct Procedure: Yes        Correct Site: Yes        Correct Position: Yes   Procedure Documentation  Procedure: intrathecal injection       Patient Position: sitting       Skin prep: Betadine       Insertion Site: L3-4. (midline approach).       Needle Gauge: 24.        Needle Length (Inches): 3.5        Spinal Needle Type: Pencan       Introducer used       Introducer: 20 G       # of attempts: 1 and  # of redirects:  1    Assessment/Narrative         Paresthesias: No.       Sensory Level: T8       CSF fluid: clear.      Opening pressure was cmH2O while  Sitting.

## 2021-09-13 NOTE — PLAN OF CARE
Patient vital signs are at baseline: Yes  Patient able to ambulate as they were prior to admission or with assist devices provided by therapies during their stay:  No,  Reason:  Requiring walker and SBA  Patient MUST void prior to discharge:  Yes  Patient able to tolerate oral intake:  Yes  Pain has adequate pain control using Oral analgesics:  Yes

## 2021-09-13 NOTE — ANESTHESIA PREPROCEDURE EVALUATION
Anesthesia Pre-Procedure Evaluation    Patient: Celia Ugalde   MRN: [id number] : 1965        Preoperative Diagnosis: DJD (degenerative joint disease) [M19.90]   Procedure : Procedure(s):  Left total hip arthroplasty     History reviewed. No pertinent past medical history.   Past Surgical History:   Procedure Laterality Date     AS EXC MALIG SKIN LESION FACE/EARS <=0.5 CM      forehead/basal cell     HC TOOTH EXTRACTION W/FORCEP  age 14      No Known Allergies   Social History     Tobacco Use     Smoking status: Never Smoker     Smokeless tobacco: Never Used   Substance Use Topics     Alcohol use: Yes     Comment: 0-1 glass wine per week      Wt Readings from Last 1 Encounters:   21 68 kg (150 lb)        Anesthesia Evaluation            ROS/MED HX  ENT/Pulmonary:  - neg pulmonary ROS     Neurologic:  - neg neurologic ROS     Cardiovascular:  - neg cardiovascular ROS     METS/Exercise Tolerance:     Hematologic:       Musculoskeletal:   (+) arthritis,     GI/Hepatic:       Renal/Genitourinary:       Endo:       Psychiatric/Substance Use:       Infectious Disease:       Malignancy:       Other:            Physical Exam    Airway        Mallampati: II   TM distance: > 3 FB   Neck ROM: full     Respiratory Devices and Support         Dental           Cardiovascular   cardiovascular exam normal          Pulmonary   pulmonary exam normal                OUTSIDE LABS:  CBC:   Lab Results   Component Value Date    HGB 12.3 2021    HGB 13.5 2021     BMP:   Lab Results   Component Value Date    GLC 86 2021     COAGS: No results found for: PTT, INR, FIBR  POC: No results found for: BGM, HCG, HCGS  HEPATIC: No results found for: ALBUMIN, PROTTOTAL, ALT, AST, GGT, ALKPHOS, BILITOTAL, BILIDIRECT, OMAR  OTHER: No results found for: PH, LACT, A1C, DALY, PHOS, MAG, LIPASE, AMYLASE, TSH, T4, T3, CRP, SED    Anesthesia Plan    ASA Status:  2   NPO Status:  NPO Appropriate    Anesthesia Type:  Spinal.              Consents    Anesthesia Plan(s) and associated risks, benefits, and realistic alternatives discussed. Questions answered and patient/representative(s) expressed understanding.     - Discussed with:  Patient         Postoperative Care       PONV prophylaxis: Ondansetron (or other 5HT-3), Background Propofol Infusion     Comments:                Ariadne Xie MD

## 2021-09-13 NOTE — OP NOTE
Procedure Date: 2021    PREOPERATIVE DIAGNOSIS:  Osteoarthritis, left hip.    POSTOPERATIVE DIAGNOSIS:  Osteoarthritis, left hip.    PROCEDURE PERFORMED:  Left total hip arthroplasty.    SURGEON:  Antonio Jeffries MD    ASSISTANT:  Beatris Story PA-C    ANESTHESIA:  Spinal with local.    ESTIMATED BLOOD LOSS:  100 mL.    COMPLICATIONS:  None.    DESCRIPTION OF PROCEDURE:  The patient was taken to the operating room where after administration of antibiotic prophylaxis, tranexamic acid, spinal anesthetic, decubitus position and sterile prep and drape, a posterior approach was utilized, splitting the fascia and reflecting the short external rotators and capsule as a sleeve, leaving the piriformis proximal to assist in later repair.  The hip was dislocated and a neck cut was made based on preoperative planning.  Retractors were carefully placed about the acetabulum and the sciatic nerve was palpated and protected, followed by sequential reaming to 55 mm and placement of a G7 56 mm shell with 2 screws as a routine precaution with good purchase.  A posteriorly directed high wall liner was placed and attention was directed to the femur.    After sequential reaming and broaching, Claire M/L size 13.5 tapered stem was placed quite carefully with no hard impaction.  The stems sat slightly proud, which matched the preoperative plan and after trialing, a -3.5 x 36 mm head was selected.  This slightly lengthened her leg as she was short preoperatively.  The hip was stable anteriorly.  Posteriorly, the hip was stable to 70 degrees of internal rotation.  After copious saline and Betadine irrigation, stout capsular closure was accomplished, followed by the remaining layered anatomic closure.  There were no complications.     Antonio Jeffries MD        D: 2021   T: 2021   MT: [id number]    Name:     MIRIAN MIRANDA  MRN:      [id number]        Account:        [id number]   :      1965            Procedure Date: 09/13/2021     Document: [id number]

## 2021-09-13 NOTE — PROGRESS NOTES
[facility]      OUTPATIENT PHYSICAL THERAPY EVALUATION  PLAN OF TREATMENT FOR OUTPATIENT REHABILITATION  (COMPLETE FOR INITIAL CLAIMS ONLY)  Patient's Last Name, First Name, M.I.  YOB: 1965  Celia Ugalde                        Provider's Name  [facility] Medical Record No.  [id number]                               Onset Date:  09/13/21   Start of Care Date:  09/13/21      Type:     _X_PT   ___OT   ___SLP Medical Diagnosis:                           PT Diagnosis:  Impaired gait   Visits from SOC:  1   _________________________________________________________________________________  Plan of Treatment/Functional Goals    Planned Interventions: balance training, bed mobility training, gait training, home exercise program, patient/family education, ROM (range of motion), stair training, strengthening, transfer training, progressive activity/exercise, risk factor education, home program guidelines     Goals: See Physical Therapy Goals on Care Plan in [facility] electronic health record.    Therapy Frequency: 2x/day  Predicted Duration of Therapy Intervention: 2 days  _________________________________________________________________________________    I CERTIFY THE NEED FOR THESE SERVICES FURNISHED UNDER        THIS PLAN OF TREATMENT AND WHILE UNDER MY CARE     (Physician co-signature of this document indicates review and certification of the therapy plan).                Certification date from: 09/13/21, Certification date to: 09/14/21    Referring Physician: Dr. Antonio Jeffries            Initial Assessment        See Physical Therapy evaluation dated 09/13/21 in Epic electronic health record.

## 2021-09-13 NOTE — CONSULTS
Hospitalist consult received. Full chart review performed. Patient underwent left LUDY this morning without complications. Patient does not appear to have any active medical issues needing ongoing management while admitted. Resumed essential home medications. Will not see the patient at this time and sign off. If any concerns arise please feel free to contact and the patient can be seen at that time.     Yolanda Cortez [facility] Medicine

## 2021-09-13 NOTE — BRIEF OP NOTE
S/P LUDY for OA  Mervin  EBL see dictated op note  No specimens  No anticipated complications

## 2021-09-14 ENCOUNTER — APPOINTMENT (OUTPATIENT)
Dept: PHYSICAL THERAPY | Facility: CLINIC | Age: 56
End: 2021-09-14
Attending: ORTHOPAEDIC SURGERY
Payer: COMMERCIAL

## 2021-09-14 ENCOUNTER — APPOINTMENT (OUTPATIENT)
Dept: OCCUPATIONAL THERAPY | Facility: CLINIC | Age: 56
End: 2021-09-14
Attending: ORTHOPAEDIC SURGERY
Payer: COMMERCIAL

## 2021-09-14 VITALS
HEIGHT: 70 IN | SYSTOLIC BLOOD PRESSURE: 119 MMHG | OXYGEN SATURATION: 97 % | TEMPERATURE: 98.2 F | BODY MASS INDEX: 21.47 KG/M2 | HEART RATE: 79 BPM | WEIGHT: 150 LBS | DIASTOLIC BLOOD PRESSURE: 60 MMHG | RESPIRATION RATE: 18 BRPM

## 2021-09-14 LAB — HGB BLD-MCNC: 8.2 G/DL (ref 11.7–15.7)

## 2021-09-14 PROCEDURE — 258N000003 HC RX IP 258 OP 636: Performed by: PHYSICIAN ASSISTANT

## 2021-09-14 PROCEDURE — 99203 OFFICE O/P NEW LOW 30 MIN: CPT | Performed by: PHYSICIAN ASSISTANT

## 2021-09-14 PROCEDURE — 97116 GAIT TRAINING THERAPY: CPT | Mod: GP | Performed by: PHYSICAL THERAPIST

## 2021-09-14 PROCEDURE — 250N000013 HC RX MED GY IP 250 OP 250 PS 637: Performed by: ORTHOPAEDIC SURGERY

## 2021-09-14 PROCEDURE — 97535 SELF CARE MNGMENT TRAINING: CPT | Mod: GO | Performed by: REHABILITATION PRACTITIONER

## 2021-09-14 PROCEDURE — 99207 PR CDG-CODE CATEGORY CHANGED: CPT | Performed by: PHYSICIAN ASSISTANT

## 2021-09-14 PROCEDURE — 97530 THERAPEUTIC ACTIVITIES: CPT | Mod: GP | Performed by: PHYSICAL THERAPIST

## 2021-09-14 PROCEDURE — 97110 THERAPEUTIC EXERCISES: CPT | Mod: GP | Performed by: PHYSICAL THERAPIST

## 2021-09-14 PROCEDURE — 97165 OT EVAL LOW COMPLEX 30 MIN: CPT | Mod: GO | Performed by: REHABILITATION PRACTITIONER

## 2021-09-14 PROCEDURE — 36415 COLL VENOUS BLD VENIPUNCTURE: CPT | Performed by: ORTHOPAEDIC SURGERY

## 2021-09-14 PROCEDURE — 85018 HEMOGLOBIN: CPT | Performed by: ORTHOPAEDIC SURGERY

## 2021-09-14 RX ORDER — SODIUM CHLORIDE 9 MG/ML
INJECTION, SOLUTION INTRAVENOUS CONTINUOUS
Status: ACTIVE | OUTPATIENT
Start: 2021-09-14 | End: 2021-09-14

## 2021-09-14 RX ADMIN — OXYCODONE HYDROCHLORIDE 10 MG: 5 TABLET ORAL at 02:16

## 2021-09-14 RX ADMIN — ACETAMINOPHEN 975 MG: 325 TABLET, FILM COATED ORAL at 13:17

## 2021-09-14 RX ADMIN — ASPIRIN 325 MG: 325 TABLET, COATED ORAL at 07:59

## 2021-09-14 RX ADMIN — SODIUM CHLORIDE 1000 ML: 9 INJECTION, SOLUTION INTRAVENOUS at 09:30

## 2021-09-14 RX ADMIN — HYDROXYZINE HYDROCHLORIDE 25 MG: 25 TABLET ORAL at 00:11

## 2021-09-14 RX ADMIN — SODIUM CHLORIDE: 9 INJECTION, SOLUTION INTRAVENOUS at 15:37

## 2021-09-14 RX ADMIN — ACETAMINOPHEN 975 MG: 325 TABLET, FILM COATED ORAL at 06:58

## 2021-09-14 ASSESSMENT — ACTIVITIES OF DAILY LIVING (ADL): PREVIOUS_RESPONSIBILITIES: MEAL PREP;HOUSEKEEPING;LAUNDRY;SHOPPING;YARDWORK;FINANCES;DRIVING

## 2021-09-14 NOTE — PROGRESS NOTES
[facility]      OUTPATIENT OCCUPATIONAL THERAPY  EVALUATION  PLAN OF TREATMENT FOR OUTPATIENT REHABILITATION  (COMPLETE FOR INITIAL CLAIMS ONLY)  Patient's Last Name, First Name, M.I.  YOB: 1965  RafhaleyboCelia IVY                          Provider's Name  [facility] Medical Record No.  [id number]                               Onset Date:  09/13/21   Start of Care Date:  09/14/21     Type:     ___PT   _X_OT   ___SLP Medical Diagnosis:  L LUDY                        OT Diagnosis:  Decreased ADLs, IADLs and community mobility    Visits from SOC:  1   _________________________________________________________________________________  Plan of Treatment/Functional Goals    Planned Interventions: ADL retraining, IADL retraining, progressive activity/exercise, transfer training   Goals: See Occupational Therapy Goals on Care Plan in [facility] electronic health record.    Therapy Frequency: 1x eval and treat  Predicted Duration of Therapy Intervention:    _________________________________________________________________________________    I CERTIFY THE NEED FOR THESE SERVICES FURNISHED UNDER        THIS PLAN OF TREATMENT AND WHILE UNDER MY CARE     (Physician co-signature of this document indicates review and certification of the therapy plan).                Certification date from: 09/14/21, Certification date to: 09/14/21    Referring Physician: Antonio Jeffries MD            Initial Assessment        See Occupational Therapy evaluation dated 09/14/21 in Epic electronic health record.

## 2021-09-14 NOTE — CONSULTS
Hospitalist Consultation      Celia Ugalde MRN# [id number]   YOB: 1965 Age: 55 year old   Date of Admission: 9/13/2021     Requesting Physician:  Dr. Jeffries  Reason for consult: Post-op medical management / Orthostatic hypotension            Assessment and Plan:   This patient is a 55 year old female who is POD #1 s/p left LUDY.  Overall doing well, no complaints. Pain controlled.    1. left LUDY - pain, prophylaxis, diet and PT/OT per ortho. On full dose ASA. Pain well controlled. Orthostatic   2. Orthostatic hypotension - noted dizziness with standing yesterday and early this morning. Stood with PT and RN, orthostatic with near syncope. BP 76/43 at the time, HR stable at 84. Hgb 12.3 pre-operatively on 9/7, now 8.2. Naive to narcotics as well and this was first time receiving anesthesia. Likely combination of blood loss from surgery and reaction to narcotics/anesthesia. Doubt active hemorrhage given normal HR. Pt reports hx of weakness and near syncope with anemia after child birth, Hgb ~6 at that time. Will give 1L NS bolus and recheck orthostatics. Minimize narcotic use on discharge, recommend 24 hr supervision with narcotic use initially at home.   3. Acute blood loss anemia - pre-op Hgb 12.3, now 8.2. 100 mL blood loss documented intraoperatively. Small amount of shadowing noted on dressing, no significant bruising noted, left hip is swollen but likely within normal limits following LUDY. BPs are orthostatic but HRs stable, doubt significant active bleeding. Does not meet criteria for transfusion. Giving fluid bolus per above. Pt reports near syncope and weakness in the past related to anemia after child birth. Ortho made aware by bedside nurse.               History of Present Illness:   This patient is a 55 year old female who is POD #1 s/p left LUDY. She has severe DJD due to osteoarthritis of the left hip and has failed conservative outpatient management so presents here for elective left  LUDY. She tolerated the surgery well, pain is well controlled, has had adequate I&Os, and she is tolerating PO intake. She is voiding without difficulty and is passing gas but no BM yet. She did develop orthostatic hypotension, with near syncopal episode today. Denies associated chest pain, palpitations or SOB, denies nausea or vomiting. Is tolerating oral intake but small amounts. She denies fever, chills, cough, abdominal pain, or diarrhea. She has no significant PMHx. This is the first time undergoing anesthesia and is naive to narcotics.               Past Medical History:   History reviewed. No pertinent past medical history.            Past Surgical History:     Past Surgical History:   Procedure Laterality Date     AS EXC MALIG SKIN LESION FACE/EARS <=0.5 CM  2009    forehead/basal cell     HC TOOTH EXTRACTION W/FORCEP  age 14                 Social History:     Social History     Tobacco Use     Smoking status: Never Smoker     Smokeless tobacco: Never Used   Substance Use Topics     Alcohol use: Yes     Comment: 0-1 glass wine per week     Drug use: No                 Family History:   I have reviewed this patient's family history  family history includes Alcohol/Drug in her father; Arrhythmia (age of onset: 89) in her mother; Autism Spectrum Disorder in her son; C.A.D. in her father; Depression in her son; Hyperlipidemia in her brother; Hypertension in her mother; Peripheral Vascular Disease (age of onset: 76) in her father.            Allergies:   No Known Allergies          Medications:     Prior to Admission medications    Medication Sig Last Dose Taking? Auth Provider   acetaminophen (TYLENOL) 325 MG tablet Take 2 tablets (650 mg) by mouth every 4 hours as needed for other (mild pain)  Yes Antonio eJffries MD   aspirin (ASA) 325 MG EC tablet Take 1 tablet (325 mg) by mouth daily  Yes Antonio Jeffries MD   hydrOXYzine (ATARAX) 25 MG tablet Take 1 tablet (25 mg) by mouth every 6 hours as  "needed for itching or anxiety (with pain, moderate pain)  Yes Antonio Jeffries MD   ibuprofen (ADVIL/MOTRIN) 600 MG tablet Take 1 tablet (600 mg) by mouth every 6 hours as needed for pain (mild)  Yes Antonio Jeffries MD   Multiple Vitamins-Minerals (WOMENS MULTIVITAMIN PO) Take 1 tablet by mouth daily  9/6/2021 Yes Reported, Patient   oxyCODONE (ROXICODONE) 5 MG tablet 1 tab po q 4-6 hrs prn pain 3-6 on pain scale  2 tabs po q 4-6hrs prn pain 7-10 on pain scale  Yes Antonio Jeffries MD   senna-docusate (SENOKOT-S/PERICOLACE) 8.6-50 MG tablet Take 1-2 tablets by mouth 2 times daily Take while on oral narcotics to prevent or treat constipation.  Yes Antonio Jeffries MD   valACYclovir (VALTREX) 1000 mg tablet Take 1,000 mg by mouth 2 times daily as needed Past Month at Unknown time Yes Reported, Patient               Review of Systems:   A comprehensive greater than 10 system review of systems was carried out.  Pertinent positives and negatives are noted above.  Otherwise negative for contributory info.            Physical Exam:   Vitals were reviewed  Blood pressure 91/52, pulse 82, temperature 97.8  F (36.6  C), temperature source Tympanic, resp. rate 16, height 1.778 m (5' 10\"), weight 68 kg (150 lb), last menstrual period 06/10/2008, SpO2 98 %.  Exam:    GENERAL:  Comfortable.  PSYCH: pleasant, oriented, No acute distress.  HEENT:  Normal conjunctiva, normal hearing, nasal mucosa and Oropharynx are normal.  NECK:  Supple, no neck vein distention, adenopathy or bruits, normal thyroid.  HEART:  Normal S1, S2 with no murmur, no pericardial rub, S3 or S4.  LUNGS:  Clear to auscultation, normal Respiratory effort.  GI:  Soft, normal bowel sounds.  EXTREMITIES:  No pedal edema, +2 pulses bilateral and equal.  L hip dressing shows a small amount of shadowing, otherwise c/d/i  SKIN:  Dry to touch, No rash, wound or ulcerations.  NEUROLOGIC:  Grossly intact.            Data:   Past 24 hours labs, " studies, and imaging were reviewed.  Recent Labs   Lab 09/14/21  0642   HGB 8.2*     No results for input(s): NA, POTASSIUM, CHLORIDE, CO2, ANIONGAP, GLC, BUN, CR, GFRESTIMATED, GFRESTBLACK, DALY, MAG, PHOS, PROTTOTAL, ALBUMIN, BILITOTAL, ALKPHOS, AST, ALT in the last 168 hours.    Gilda Toscano PA-C    Pt discussed with Dr. Li who agrees with the care as discussed above.

## 2021-09-14 NOTE — PLAN OF CARE
Occupational Therapy Discharge Summary    Reason for therapy discharge:    All goals and outcomes met, no further needs identified.    Progress towards therapy goal(s). See goals on Care Plan in [facility] electronic health record for goal details.  Goals met    Therapy recommendation(s):    No further therapy is recommended.

## 2021-09-14 NOTE — PLAN OF CARE
Patient vital signs are at baseline: Yes, slight softer BP's and will continue to monitor.  Patient able to ambulate as they were prior to admission or with assist devices provided by therapies during their stay:  Yes, assist of 1, walker, and gait belt however did have episodes of dizziness and lightheadedness. Took 2 times to try and get up to bedside commode as pt was symptomatic with low BPs. AM nurse aware.  Patient MUST void prior to discharge:  Yes, minimal and pt understands to increase fluid intake.  Patient able to tolerate oral intake:  Yes  Pain has adequate pain control using Oral analgesics:  Yes, oxycodone, atarax, and tylenol.     Pt pleasant and cooperative. LS clear - encouraged IS use, BS present, passing flatus. CMS intact with no numbness/tingling. Drsg intact with small drainage noted. Plan is to possibly discharge to home today once meets criteria.

## 2021-09-14 NOTE — PROGRESS NOTES
Patient vital signs are at baseline: Yes  Patient able to ambulate as they were prior to admission or with assist devices provided by therapies during their stay:  Yes  Patient MUST void prior to discharge:  Yes  Patient able to tolerate oral intake:  Yes  Pain has adequate pain control using Oral analgesics:  Yes     Patient is eating well and tolerating fluids. Ambulating with A-1. Passing gas. Dressing has scant drainage. Rating pain 4/10 but stated has high tolerance for pain. Saline locked. Will camron to monitor.

## 2021-09-14 NOTE — PROGRESS NOTES
Spoke with RN. Has improved since bolus of fluid this morning. BPs are better but still slightly low. Will give one more bolus and then send home today. Hospitalist does not feel active bleeding and I agree.   Beatris Story PA-C  [phone]

## 2021-09-14 NOTE — PROGRESS NOTES
"Orthostatic hypotension. Became faint and weak when standing at bedside with PT and myself. 2 assist to stand at bedside and had to lay down right away with 1 minute \"I don't feel well.\" She became limp and laid down right away with our assist. Trying to not give oxycodone in case this is contributing factor. hgb 8.2 today. Denies pain now. Will let hospitalist know. 1248 up to bedside commode x2. The first time did well and marched in place with 2 assist, walker, gb, no dizziness. After the 2nd time up to bedside commode- took a few steps, in less than 5 minutes felt a little lightheaded and needed to lay down. Moving very well with assist for safety and GB, walker. SO at bedside. No edema. teds on. BP stable. Fluids well. Encouraged food intake. Pain controlled with tylenol.  1439 Patient vital signs are at baseline: yes  Patient able to ambulate as they were prior to admission or with assist devices provided by therapies during their stay:  1 assist, walker, GB. Ambulation improving without feeling lightheaded until 25 minutes after working with OT this afternoon. After completing both OT and PT sessions - both sessions 25 minutes in length each- became mild lightheaded per patient at the very end of each session. After each session and rest, lightheaded improved.   Patient MUST void prior to discharge:  yes  Patient able to tolerate oral intake:  yes  Pain has adequate pain control using Oral analgesics:  Yes tylenol  1540 talked with Ester. 1 more liter of normal saline and check BP. Ok to discharge home with evening after bolus. Made Gilda SU aware via voc[employer] web of the plan.   "

## 2021-09-14 NOTE — PROGRESS NOTES
"Patient just had an episode of dizziness and diaphoresis after getting up to walk to the bathroom.  Vital after the incident were as noted  below  Blood pressure 97/60, pulse 89, temperature 98.2  F (36.8  C), temperature source Temporal, resp. rate 17, height 1.778 m (5' 10\"), weight 68 kg (150 lb), last menstrual period 06/10/2008, SpO2 97 %.      "

## 2021-09-14 NOTE — PLAN OF CARE
Physical Therapy Discharge Summary    Reason for therapy discharge:    Home with boyfriend     Progress towards therapy goal(s). See goals on Care Plan in [facility] electronic health record for goal details.  Goals not met.  Barriers to achieving goals:   limited tolerance for therapy.  Pt hypotensive with mobility  Therapy recommendation(s):    Continue home exercise program.  Gait at home with SBA and LUDY exercises to increase LE strength for improved mobility.

## 2021-09-14 NOTE — PROGRESS NOTES
09/14/21 1340   Quick Adds   Type of Visit Initial Occupational Therapy Evaluation   Living Environment   People in home significant other   Current Living Arrangements house   Number of Stairs, Main Entrance 2   Stair Railings, Main Entrance none   Transportation Anticipated family or friend will provide   Living Environment Comments Two level house. upstairs has bedroom and full bath. Has a walk in shower. Toilet seat riser upstairs, and downstairs toilet has two handled riser.   Self-Care   Usual Activity Tolerance good   Current Activity Tolerance fair   Equipment Currently Used at Home walker, standard;shower chair;raised toilet seat  (reacher x 2)   Activity/Exercise/Self-Care Comment Pt very active prior to surgery. I in all ADLs/ IADLs and communtity mobility.    Instrumental Activities of Daily Living (IADL)   Previous Responsibilities meal prep;housekeeping;laundry;shopping;yardwork;finances;driving   IADL Comments Significant other to assist with ADLs/ IADLs.   Disability/Function   Fall history within last six months no   Change in Functional Status Since Onset of Current Illness/Injury yes   General Information   Onset of Illness/Injury or Date of Surgery 09/13/21   Referring Physician Antonio Jeffries MD   Patient/Family Therapy Goal Statement (OT) To dc today if  possible   Additional Occupational Profile Info/Pertinent History of Current Problem Pt is a 55 year old female who is POD #1 s/p left LUDY.Overall doing well, no complaints. Pain controlled. noted dizziness with standing yesterday and early this morning. Stood with PT and RN, orthostatic with near syncope. BP 76/43 at the time, HR stable at 84. Pt received IV bolus this AM due to symptoms.    Performance Patterns (Routines, Roles, Habits) Prior to surgery was I in all ADLs and IADLs. Enjoyed being active in her community and caring for her dog.    Existing Precautions/Restrictions fall;no hip IR;no hip ADD past midline   Left Lower  Extremity (Weight-bearing Status) weight-bearing as tolerated (WBAT)   General Observations and Info Pt is alert to self and symptoms. Is able to communicate if starting to feel faint.   Cognitive Status Examination   Orientation Status orientation to person, place and time   Pain Assessment   Patient Currently in Pain Yes, see Vital Sign flowsheet  (just took medication, not high pain)   Posture   Posture not impaired   Range of Motion Comprehensive   General Range of Motion no range of motion deficits identified   Coordination   Upper Extremity Coordination No deficits were identified   Bed Mobility   Bed Mobility rolling left   Rolling Left [place] (Bed Mobility) independent   Assistive Device (Bed Mobility) bed rails   Transfers   Transfers sit-stand transfer;toilet transfer;shower transfer   Sit-Stand Transfer   Sit-Stand [place] (Transfers) contact guard   Assistive Device (Sit-Stand Transfers) walker, front-wheeled   Shower Transfer   G[place] Level (Shower Transfer) contact guard   Assistive Device (Shower Transfer) walker, front-wheeled   Shower Transfer Comments performed backward transfer for safety and following precautions.    Toilet Transfer   Type (Toilet Transfer) sit-stand;stand-sit   [place] Level (Toilet Transfer) supervision   Assistive Device (Toilet Transfer) walker, front-wheeled   Balance   Balance Assessment no deficits were identified   Clinical Impression   Criteria for Skilled Therapeutic Interventions Met (OT) yes;meets criteria;skilled treatment is necessary   OT Diagnosis Decreased ADLs, IADLs and community mobility    OT Problem List-Impairments impacting ADL problems related to;activity tolerance impaired;mobility;post-surgical precautions   Assessment of Occupational Performance 3-5 Performance Deficits   Identified Performance Deficits Dressing, home mgmt, showering, community mobility, driving   Planned Therapy Interventions (OT) ADL retraining;IADL  retraining;progressive activity/exercise;transfer training   Clinical Decision Making Complexity (OT) low complexity   Therapy Frequency (OT) 1x eval and treat   Anticipated Equipment Needs Upon Discharge (OT)   (long handled sponge )   Risk & Benefits of therapy have been explained evaluation/treatment results reviewed;care plan/treatment goals reviewed;risks/benefits reviewed;participants included;patient;spouse/significant other   OT Discharge Planning    OT Rationale for DC Rec Defer to ortho team. Pt has met all OT goals. Pt safe to return home SO will be able to assist with ADLs/IADLs and community mobility.    Therapy Certification   Start of Care Date 09/14/21   Certification date from 09/14/21   Certification date to 09/14/21   Medical Diagnosis L LUDY   Total Evaluation Time (Minutes)   Total Evaluation Time (Minutes) 8

## 2021-09-14 NOTE — PROGRESS NOTES
"Orthopedic Surgery  9/14/2021  POD 1    S: Patient voices no unexpected ortho complaints today. Denies chest pain or shortness of breath. Did have dizzy spell upon getting up.    O: Blood pressure (!) 83/31, pulse 72, temperature 97.7  F (36.5  C), temperature source Temporal, resp. rate 16, height 1.778 m (5' 10\"), weight 68 kg (150 lb), last menstrual period 06/10/2008, SpO2 96 %.  Lab Results   Component Value Date    HGB 12.3 09/07/2021     No results found for: INR  I/O last 3 completed shifts:  In: 3130 [P.O.:1130; I.V.:2000]  Out: 100 [Blood:100]  Distal extremity CMSI bilaterally.  Calves are negative bilaterally, both soft and nontender.  The dressing is C/D/I.        A: Ms. Ugalde is doing well status post Procedure(s):  Left total hip arthroplasty.    P: Continue physical therapy. Continue DVT pphx with ASA due to high mobility and low risk factors for DVT. Anticipate discharge to home today. Discussed that likely dehydrated and effects of Oxy as BP low and low urine output. Encouraged fluids and decrease in Oxy. Will continue to observe this but likely will not impede discharge.    Beatris Story PA-C  [phone]  "

## 2021-09-15 NOTE — PLAN OF CARE
Patient's After Visit Summary was reviewed with patient and/or spouse.   Patient verbalized understanding of After Visit Summary, recommended follow up and was given an opportunity to ask questions.  Discharge medications sent home with patient/family: YES   Discharged with spouse, all belongings sent with patient.

## 2021-09-20 NOTE — PROGRESS NOTES
[facility]      OUTPATIENT PHYSICAL THERAPY EVALUATION  PLAN OF TREATMENT FOR OUTPATIENT REHABILITATION  (COMPLETE FOR INITIAL CLAIMS ONLY)  Patient's Last Name, First Name, M.I.  YOB: 1965  Celia Ugalde                        Provider's Name  [facility] Medical Record No.  [id number]                               Onset Date:  09/13/21   Start of Care Date:  09/13/21      Type:     _X_PT   ___OT   ___SLP Medical Diagnosis:                           PT Diagnosis:  Impaired gait   Visits from SOC:  1   _________________________________________________________________________________  Plan of Treatment/Functional Goals    Planned Interventions: balance training, bed mobility training, gait training, home exercise program, patient/family education, ROM (range of motion), stair training, strengthening, transfer training, progressive activity/exercise, risk factor education, home program guidelines     Goals: See Physical Therapy Goals on Care Plan in [facility] electronic health record.    Therapy Frequency: 2x/day  Predicted Duration of Therapy Intervention: 2 days  _________________________________________________________________________________    I CERTIFY THE NEED FOR THESE SERVICES FURNISHED UNDER        THIS PLAN OF TREATMENT AND WHILE UNDER MY CARE     (Physician co-signature of this document indicates review and certification of the therapy plan).                Certification date from: 09/13/21, Certification date to: 09/14/21    Referring Physician: Dr. Antonio Jeffries            Initial Assessment        See Physical Therapy evaluation dated 09/13/21 in Epic electronic health record.

## 2021-10-30 ENCOUNTER — HEALTH MAINTENANCE LETTER (OUTPATIENT)
Age: 56
End: 2021-10-30

## 2022-01-04 DIAGNOSIS — B00.1 RECURRENT COLD SORES: Primary | ICD-10-CM

## 2022-01-04 RX ORDER — VALACYCLOVIR HYDROCHLORIDE 1 G/1
1000 TABLET, FILM COATED ORAL 2 TIMES DAILY PRN
Qty: 12 TABLET | Refills: 0 | Status: SHIPPED | OUTPATIENT
Start: 2022-01-04 | End: 2022-02-03 | Stop reason: DRUGHIGH

## 2022-01-04 NOTE — TELEPHONE ENCOUNTER
Pt scheduled appt with you 02/03/22 she will be establishing care with you. She needs short supply until her appt. Can you send this in?    Celia Ugalde is requesting a refill of:    Pending Prescriptions:                       Disp   Refills    valACYclovir (VALTREX) 1000 mg tablet     12 tab*0            Sig: Take 1 tablet (1,000 mg) by mouth 2 times daily           as needed (for outbreak of cold sores)

## 2022-02-03 ENCOUNTER — OFFICE VISIT (OUTPATIENT)
Dept: FAMILY MEDICINE | Facility: CLINIC | Age: 57
End: 2022-02-03

## 2022-02-03 VITALS
DIASTOLIC BLOOD PRESSURE: 80 MMHG | BODY MASS INDEX: 22.36 KG/M2 | TEMPERATURE: 97.7 F | OXYGEN SATURATION: 99 % | HEIGHT: 70 IN | SYSTOLIC BLOOD PRESSURE: 120 MMHG | WEIGHT: 156.2 LBS | HEART RATE: 67 BPM

## 2022-02-03 DIAGNOSIS — Z82.49 FAMILY HISTORY OF HEART VALVE ABNORMALITY: ICD-10-CM

## 2022-02-03 DIAGNOSIS — B00.1 RECURRENT COLD SORES: Primary | ICD-10-CM

## 2022-02-03 DIAGNOSIS — Z96.642 HISTORY OF TOTAL LEFT HIP REPLACEMENT: ICD-10-CM

## 2022-02-03 DIAGNOSIS — Z23 NEED FOR VACCINATION: ICD-10-CM

## 2022-02-03 DIAGNOSIS — Z85.828 HISTORY OF BASAL CELL CARCINOMA: ICD-10-CM

## 2022-02-03 PROCEDURE — 90686 IIV4 VACC NO PRSV 0.5 ML IM: CPT | Performed by: PHYSICIAN ASSISTANT

## 2022-02-03 PROCEDURE — 90471 IMMUNIZATION ADMIN: CPT | Performed by: PHYSICIAN ASSISTANT

## 2022-02-03 PROCEDURE — 99213 OFFICE O/P EST LOW 20 MIN: CPT | Mod: 25 | Performed by: PHYSICIAN ASSISTANT

## 2022-02-03 RX ORDER — VALACYCLOVIR HYDROCHLORIDE 500 MG/1
500 TABLET, FILM COATED ORAL 2 TIMES DAILY
Qty: 21 TABLET | Refills: 1 | Status: SHIPPED | OUTPATIENT
Start: 2022-02-03 | End: 2023-06-15

## 2022-02-03 ASSESSMENT — MIFFLIN-ST. JEOR: SCORE: 1368.77

## 2022-02-03 NOTE — PROGRESS NOTES
Assessment & Plan     Recurrent cold sores    - valACYclovir (VALTREX) 500 MG tablet  Dispense: 21 tablet; Refill: 1    Need for vaccination    - FLU VAC PRESRV FREE QUAD SPLIT VIR 3+YRS IM    History of basal cell carcinoma      History of total left hip replacement      Family history of heart valve abnormality    - Echocardiogram Complete      Pt to RTC for fasting CPE      SHARLENE Salazar  [facility]    Subjective     Nursing Notes:   Ratna Lopez CMA  2/3/2022 11:37 AM  Signed  Chief Complaint   Patient presents with     Recheck Medication     nonfasting      Pre-Visit Screening:  Immunizations:flu shot today  Colonoscopy:NA  Mammogram:needs-declined   Asthma Action Test/Plan:NA  PHQ9:NA  GAD7:Na  Questioned patient about current smoking habits Pt.never  OK to leave a detailed message on voice mail for today's visit yes, phone # [phone]                Celia Ugalde is a 56 year old female who presents to clinic today for the following health issues     HPI       Cold sores - one every four months - oral    Sister with need for TAVR  Pt hasn't ever had echo  Specifically denies chest pain, palpitations, dyspnea, or peripheral edema.              Current Medications  Current Outpatient Medications   Medication Sig Dispense Refill     acetaminophen (TYLENOL) 325 MG tablet Take 2 tablets (650 mg) by mouth every 4 hours as needed for other (mild pain) 100 tablet 0     aspirin (ASA) 325 MG EC tablet Take 1 tablet (325 mg) by mouth daily 30 tablet 0     ibuprofen (ADVIL/MOTRIN) 600 MG tablet Take 1 tablet (600 mg) by mouth every 6 hours as needed for pain (mild) 30 tablet 0     Multiple Vitamins-Minerals (WOMENS MULTIVITAMIN PO) Take 1 tablet by mouth daily        oxyCODONE (ROXICODONE) 5 MG tablet 1 tab po q 4-6 hrs prn pain 3-6 on pain scale  2 tabs po q 4-6hrs prn pain 7-10 on pain scale 50 tablet 0     senna-docusate (SENOKOT-S/PERICOLACE) 8.6-50 MG tablet Take 1-2  "tablets by mouth 2 times daily Take while on oral narcotics to prevent or treat constipation. 30 tablet 0     valACYclovir (VALTREX) 1000 mg tablet Take 1 tablet (1,000 mg) by mouth 2 times daily as needed (for outbreak of cold sores) 12 tablet 0         Constitutional, HEENT, Cardiovascular, Pulmonary, GI and  systems are negative, except as otherwise noted.          Objective    /80 (BP Location: Right arm, Patient Position: Sitting, Cuff Size: Adult Large)   Pulse 67   Temp 97.7  F (36.5  C) (Oral)   Ht 1.778 m (5' 10\")   Wt 70.9 kg (156 lb 3.2 oz)   LMP 06/10/2008   SpO2 99%   BMI 22.41 kg/m    Body mass index is 22.41 kg/m .  Physical Exam   GENERAL: healthy, alert and no distress  EYES: Eyes grossly normal to inspection, PERRL and conjunctivae and sclerae normal  HENT: ear canals and TM's normal, nose and mouth without ulcers or lesions  NECK: no adenopathy, no asymmetry, masses, or scars and thyroid normal to palpation  RESP: lungs clear to auscultation - no rales, rhonchi or wheezes  CV: regular rate and rhythm, normal S1 S2, no S3 or S4, no murmur, click or rub, no peripheral edema and peripheral pulses strong      "

## 2022-02-03 NOTE — NURSING NOTE
Chief Complaint   Patient presents with     Recheck Medication     nonfasting      Pre-Visit Screening:  Immunizations:flu shot today  Colonoscopy:NA  Mammogram:needs-declined   Asthma Action Test/Plan:NA  PHQ9:NA  GAD7:Na  Questioned patient about current smoking habits Pt.never  OK to leave a detailed message on voice mail for today's visit yes, phone # [phone]

## 2022-03-08 ENCOUNTER — TELEPHONE (OUTPATIENT)
Dept: FAMILY MEDICINE | Facility: CLINIC | Age: 57
End: 2022-03-08

## 2022-03-08 NOTE — TELEPHONE ENCOUNTER
Please be advised that we have attempted to reach this patient to schedule their echocardiogram however patient refused to schedule.  No further attempts to reach this patient will by made by scheduling team.  Please contact this patient to encourage them to call our office at [phone] and schedule this order if it is still clinically recommended.      Thank you for allowing [facility] to participate in this patients healthcare needs.

## 2022-03-09 NOTE — TELEPHONE ENCOUNTER
The patient had insurance coverage questions - please contact the patient via [employer] - she responds that way. She does want to schedule if covered.    Laurie Epperson PA-C  3/9/2022

## 2022-04-16 ENCOUNTER — HEALTH MAINTENANCE LETTER (OUTPATIENT)
Age: 57
End: 2022-04-16

## 2022-10-16 ENCOUNTER — HEALTH MAINTENANCE LETTER (OUTPATIENT)
Age: 57
End: 2022-10-16

## 2023-06-01 ENCOUNTER — HEALTH MAINTENANCE LETTER (OUTPATIENT)
Age: 58
End: 2023-06-01

## 2023-06-15 ENCOUNTER — OFFICE VISIT (OUTPATIENT)
Dept: FAMILY MEDICINE | Facility: CLINIC | Age: 58
End: 2023-06-15

## 2023-06-15 VITALS
HEIGHT: 70 IN | HEART RATE: 72 BPM | SYSTOLIC BLOOD PRESSURE: 122 MMHG | WEIGHT: 152 LBS | TEMPERATURE: 97.9 F | OXYGEN SATURATION: 96 % | DIASTOLIC BLOOD PRESSURE: 74 MMHG | BODY MASS INDEX: 21.76 KG/M2

## 2023-06-15 DIAGNOSIS — Z13.220 SCREENING FOR LIPOID DISORDERS: Primary | ICD-10-CM

## 2023-06-15 DIAGNOSIS — B00.1 RECURRENT COLD SORES: ICD-10-CM

## 2023-06-15 LAB
BUN SERPL-MCNC: 13 MG/DL (ref 7–25)
BUN/CREATININE RATIO: 12.5 (ref 6–32)
CALCIUM SERPL-MCNC: 9.6 MG/DL (ref 8.6–10.3)
CHLORIDE SERPLBLD-SCNC: 102.1 MMOL/L (ref 98–110)
CHOLEST SERPL-MCNC: 185 MG/DL (ref 0–199)
CHOLEST/HDLC SERPL: 3 {RATIO} (ref 0–5)
CO2 SERPL-SCNC: 31.6 MMOL/L (ref 20–32)
CREAT SERPL-MCNC: 1.04 MG/DL (ref 0.6–1.3)
GLUCOSE SERPL-MCNC: 91 MG/DL (ref 60–99)
HDLC SERPL-MCNC: 70 MG/DL (ref 40–150)
LDLC SERPL CALC-MCNC: 101 MG/DL (ref 0–130)
POTASSIUM SERPL-SCNC: 4.61 MMOL/L (ref 3.5–5.3)
SODIUM SERPL-SCNC: 137.2 MMOL/L (ref 135–146)
TRIGL SERPL-MCNC: 68 MG/DL (ref 0–149)

## 2023-06-15 PROCEDURE — 80061 LIPID PANEL: CPT | Performed by: PHYSICIAN ASSISTANT

## 2023-06-15 PROCEDURE — 99213 OFFICE O/P EST LOW 20 MIN: CPT | Performed by: PHYSICIAN ASSISTANT

## 2023-06-15 PROCEDURE — 36415 COLL VENOUS BLD VENIPUNCTURE: CPT | Performed by: PHYSICIAN ASSISTANT

## 2023-06-15 PROCEDURE — 80048 BASIC METABOLIC PNL TOTAL CA: CPT | Performed by: PHYSICIAN ASSISTANT

## 2023-06-15 RX ORDER — VALACYCLOVIR HYDROCHLORIDE 500 MG/1
500 TABLET, FILM COATED ORAL 2 TIMES DAILY
Qty: 21 TABLET | Refills: 1 | Status: SHIPPED | OUTPATIENT
Start: 2023-06-15

## 2023-06-15 NOTE — PROGRESS NOTES
"  Assessment & Plan     Recurrent cold sores- Patient has a strong history of cold sore episodes and evidence of healing cold sore on left upper lip. Patient states valacyclovir has worked well for her in the past with no side effects.   - valACYclovir (VALTREX) 500 MG tablet  Dispense: 21 tablet; Refill: 1-may call within 1 year of appt if needing more meds  - VENOUS COLLECTION  - Basic Metabolic Panel (BFP)    Screening for lipoid disorders- Per patient request   - VENOUS COLLECTION  - Lipid Panel (BFP)    Follow up 1 year OV    King Corley PA-C  [facility]    Subjective   Awilda is a 57 year old, presenting for the following health issues:  Medication Request (Would like a refill on her cold sore medication.)    HPI   Patient gets cold sores periodically. She notices they come on with change in weather, small trauma to the area, or stress. Patient feels valacyclovir works well to get rid of the cold sores. She usually takes the valacyclovir for a few days during a flare to calm down the cold sores.   She just finished up her last cycle 2 weeks ago. She sometimes goes months without getting cold sores but other times gets multiple in a row. Patient is out of her valocyclovir today and requesting a refill.     Patient counseled on preventative care screenings- mammogram and PAP- patient says her last ones were likely 2015 or before for both. Patient declines at this time.     Patient counseled on herpes zoster vaccine and said she will consider vaccine.    Review of Systems   Constitutional, HEENT, cardiovascular, pulmonary, gi and gu systems are negative, except as otherwise noted.      Objective    /74 (BP Location: Left arm, Patient Position: Sitting, Cuff Size: Adult Large)   Pulse 72   Temp 97.9  F (36.6  C) (Temporal)   Ht 1.778 m (5' 10\")   Wt 68.9 kg (152 lb)   LMP 06/10/2008   SpO2 96%   BMI 21.81 kg/m    Body mass index is 21.81 kg/m .  Physical Exam   GENERAL: healthy, alert " and no distress   NECK: no adenopathy, no asymmetry, masses, or scars   RESP: lungs clear to auscultation - no rales, rhonchi or wheezes  CV: regular rate and rhythm, normal S1 S2, no S3 or S4, no murmur, click or rub, no peripheral edema  ABDOMEN: soft, nontender, no hepatosplenomegaly, no masses and bowel sounds normal  SKIN: evidence of healing cold sore on left side of upper lip   PSYCH: mentation appears normal, affect normal/bright

## 2023-11-04 ENCOUNTER — HEALTH MAINTENANCE LETTER (OUTPATIENT)
Age: 58
End: 2023-11-04

## 2024-06-17 PROBLEM — Z76.89 HEALTH CARE HOME: Status: RESOLVED | Noted: 2019-05-16 | Resolved: 2024-06-17

## 2024-08-10 ENCOUNTER — HEALTH MAINTENANCE LETTER (OUTPATIENT)
Age: 59
End: 2024-08-10

## 2025-08-16 ENCOUNTER — HEALTH MAINTENANCE LETTER (OUTPATIENT)
Age: 60
End: 2025-08-16

## (undated) DEVICE — SOL NACL 0.9% IRRIG 3000ML BAG 2B7477

## (undated) DEVICE — LINEN DRAPE 54X72" 5467

## (undated) DEVICE — GLOVE PROTEXIS W/NEU-THERA 8.5  2D73TE85

## (undated) DEVICE — GLOVE PROTEXIS BLUE W/NEU-THERA 8.5  2D73EB85

## (undated) DEVICE — PREP CHLORAPREP 26ML TINTED HI-LITE ORANGE 930815

## (undated) DEVICE — SUCTION MANIFOLD NEPTUNE 2 SYS 4 PORT 0702-020-000

## (undated) DEVICE — DRILL BIT BIOM QUICK CONNECTING RING LOCK 3.2X30MM 31-323230

## (undated) DEVICE — GLOVE PROTEXIS POWDER FREE 8.5 ORTHOPEDIC 2D73ET85

## (undated) DEVICE — SOL WATER IRRIG 1000ML BOTTLE 2F7114

## (undated) DEVICE — GLOVE PROTEXIS W/NEU-THERA 7.0  2D73TE70

## (undated) DEVICE — PACK TOTAL HIP RIDGES LATEX PO15HIFSG

## (undated) DEVICE — BLADE SAW SAGITTAL STRK 18X90X1.27MM HD SYS 6 6118-127-090

## (undated) DEVICE — LINEN HALF SHEET 5512

## (undated) DEVICE — GLOVE PROTEXIS BLUE W/NEU-THERA 7.0  2D73EB70

## (undated) DEVICE — LINEN ORTHO ACL PACK 5447

## (undated) DEVICE — SU VICRYL 1 CT-1 27" J341H

## (undated) DEVICE — GLOVE PROTEXIS POWDER FREE 7.0 ORTHOPEDIC 2D73ET70

## (undated) DEVICE — BAG CLEAR TRASH 1.3M 39X33" P4040C

## (undated) DEVICE — LINEN FULL SHEET 5511

## (undated) DEVICE — SU FIBERWIRE 2 38"  AR-7200

## (undated) DEVICE — SET HANDPIECE INTERPULSE W/COAXIAL FAN SPRAY TIP 0210118000

## (undated) DEVICE — SU VICRYL 2-0 CT-1 27" UND J259H

## (undated) DEVICE — ESU GROUND PAD ADULT W/CORD E7507

## (undated) DEVICE — SU ETHIBOND 0 CT-1 CR 8X18" CX21D

## (undated) RX ORDER — PROPOFOL 10 MG/ML
INJECTION, EMULSION INTRAVENOUS
Status: DISPENSED
Start: 2021-09-13

## (undated) RX ORDER — CEFAZOLIN SODIUM/WATER 2 G/20 ML
SYRINGE (ML) INTRAVENOUS
Status: DISPENSED
Start: 2021-09-13

## (undated) RX ORDER — FENTANYL CITRATE 50 UG/ML
INJECTION, SOLUTION INTRAMUSCULAR; INTRAVENOUS
Status: DISPENSED
Start: 2021-09-13

## (undated) RX ORDER — ONDANSETRON 2 MG/ML
INJECTION INTRAMUSCULAR; INTRAVENOUS
Status: DISPENSED
Start: 2021-09-13

## (undated) RX ORDER — DEXAMETHASONE SODIUM PHOSPHATE 4 MG/ML
INJECTION, SOLUTION INTRA-ARTICULAR; INTRALESIONAL; INTRAMUSCULAR; INTRAVENOUS; SOFT TISSUE
Status: DISPENSED
Start: 2021-09-13

## (undated) RX ORDER — TRANEXAMIC ACID 650 MG/1
TABLET ORAL
Status: DISPENSED
Start: 2021-09-13

## (undated) RX ORDER — ACETAMINOPHEN 325 MG/1
TABLET ORAL
Status: DISPENSED
Start: 2021-09-13